# Patient Record
Sex: MALE | Race: WHITE | NOT HISPANIC OR LATINO | URBAN - METROPOLITAN AREA
[De-identification: names, ages, dates, MRNs, and addresses within clinical notes are randomized per-mention and may not be internally consistent; named-entity substitution may affect disease eponyms.]

---

## 2020-11-19 ENCOUNTER — INPATIENT (INPATIENT)
Facility: HOSPITAL | Age: 70
LOS: 7 days | Discharge: HOME HEALTH | DRG: 236 | End: 2020-11-27
Attending: THORACIC SURGERY (CARDIOTHORACIC VASCULAR SURGERY) | Admitting: THORACIC SURGERY (CARDIOTHORACIC VASCULAR SURGERY)
Payer: MEDICARE

## 2020-11-19 VITALS — WEIGHT: 182.1 LBS | HEIGHT: 66 IN

## 2020-11-19 DIAGNOSIS — Z98.890 OTHER SPECIFIED POSTPROCEDURAL STATES: Chronic | ICD-10-CM

## 2020-11-19 DIAGNOSIS — Z90.89 ACQUIRED ABSENCE OF OTHER ORGANS: Chronic | ICD-10-CM

## 2020-11-19 LAB
A1C WITH ESTIMATED AVERAGE GLUCOSE RESULT: 5.3 % — SIGNIFICANT CHANGE UP (ref 4–5.6)
ALBUMIN SERPL ELPH-MCNC: 4.5 G/DL — SIGNIFICANT CHANGE UP (ref 3.3–5)
ALP SERPL-CCNC: 57 U/L — SIGNIFICANT CHANGE UP (ref 40–120)
ALT FLD-CCNC: 39 U/L — SIGNIFICANT CHANGE UP (ref 10–45)
ANION GAP SERPL CALC-SCNC: 12 MMOL/L — SIGNIFICANT CHANGE UP (ref 5–17)
APPEARANCE UR: CLEAR — SIGNIFICANT CHANGE UP
APTT BLD: 31.4 SEC — SIGNIFICANT CHANGE UP (ref 27.5–35.5)
AST SERPL-CCNC: 35 U/L — SIGNIFICANT CHANGE UP (ref 10–40)
BASOPHILS # BLD AUTO: 0.04 K/UL — SIGNIFICANT CHANGE UP (ref 0–0.2)
BASOPHILS NFR BLD AUTO: 0.7 % — SIGNIFICANT CHANGE UP (ref 0–2)
BILIRUB SERPL-MCNC: 0.9 MG/DL — SIGNIFICANT CHANGE UP (ref 0.2–1.2)
BILIRUB UR-MCNC: NEGATIVE — SIGNIFICANT CHANGE UP
BLD GP AB SCN SERPL QL: POSITIVE — SIGNIFICANT CHANGE UP
BUN SERPL-MCNC: 12 MG/DL — SIGNIFICANT CHANGE UP (ref 7–23)
CALCIUM SERPL-MCNC: 9.3 MG/DL — SIGNIFICANT CHANGE UP (ref 8.4–10.5)
CHLORIDE SERPL-SCNC: 101 MMOL/L — SIGNIFICANT CHANGE UP (ref 96–108)
CHOLEST SERPL-MCNC: 170 MG/DL — SIGNIFICANT CHANGE UP
CK MB CFR SERPL CALC: 8.8 NG/ML — HIGH (ref 0–6.7)
CK SERPL-CCNC: 237 U/L — HIGH (ref 30–200)
CO2 SERPL-SCNC: 26 MMOL/L — SIGNIFICANT CHANGE UP (ref 22–31)
COLOR SPEC: YELLOW — SIGNIFICANT CHANGE UP
CREAT SERPL-MCNC: 0.7 MG/DL — SIGNIFICANT CHANGE UP (ref 0.5–1.3)
DIFF PNL FLD: NEGATIVE — SIGNIFICANT CHANGE UP
EOSINOPHIL # BLD AUTO: 0.06 K/UL — SIGNIFICANT CHANGE UP (ref 0–0.5)
EOSINOPHIL NFR BLD AUTO: 1 % — SIGNIFICANT CHANGE UP (ref 0–6)
ESTIMATED AVERAGE GLUCOSE: 105 MG/DL — SIGNIFICANT CHANGE UP (ref 68–114)
GLUCOSE SERPL-MCNC: 96 MG/DL — SIGNIFICANT CHANGE UP (ref 70–99)
GLUCOSE UR QL: NEGATIVE — SIGNIFICANT CHANGE UP
HCT VFR BLD CALC: 43.3 % — SIGNIFICANT CHANGE UP (ref 39–50)
HDLC SERPL-MCNC: 52 MG/DL — SIGNIFICANT CHANGE UP
HGB BLD-MCNC: 15 G/DL — SIGNIFICANT CHANGE UP (ref 13–17)
IMM GRANULOCYTES NFR BLD AUTO: 0.2 % — SIGNIFICANT CHANGE UP (ref 0–1.5)
INR BLD: 1.02 — SIGNIFICANT CHANGE UP (ref 0.88–1.16)
KETONES UR-MCNC: NEGATIVE — SIGNIFICANT CHANGE UP
LEUKOCYTE ESTERASE UR-ACNC: NEGATIVE — SIGNIFICANT CHANGE UP
LIPID PNL WITH DIRECT LDL SERPL: 73 MG/DL — SIGNIFICANT CHANGE UP
LYMPHOCYTES # BLD AUTO: 3.14 K/UL — SIGNIFICANT CHANGE UP (ref 1–3.3)
LYMPHOCYTES # BLD AUTO: 52.1 % — HIGH (ref 13–44)
MCHC RBC-ENTMCNC: 33 PG — SIGNIFICANT CHANGE UP (ref 27–34)
MCHC RBC-ENTMCNC: 34.6 GM/DL — SIGNIFICANT CHANGE UP (ref 32–36)
MCV RBC AUTO: 95.2 FL — SIGNIFICANT CHANGE UP (ref 80–100)
MONOCYTES # BLD AUTO: 0.58 K/UL — SIGNIFICANT CHANGE UP (ref 0–0.9)
MONOCYTES NFR BLD AUTO: 9.6 % — SIGNIFICANT CHANGE UP (ref 2–14)
NEUTROPHILS # BLD AUTO: 2.2 K/UL — SIGNIFICANT CHANGE UP (ref 1.8–7.4)
NEUTROPHILS NFR BLD AUTO: 36.4 % — LOW (ref 43–77)
NITRITE UR-MCNC: NEGATIVE — SIGNIFICANT CHANGE UP
NON HDL CHOLESTEROL: 118 MG/DL — SIGNIFICANT CHANGE UP
NRBC # BLD: 0 /100 WBCS — SIGNIFICANT CHANGE UP (ref 0–0)
NT-PROBNP SERPL-SCNC: 76 PG/ML — SIGNIFICANT CHANGE UP (ref 0–300)
PH UR: 7.5 — SIGNIFICANT CHANGE UP (ref 5–8)
PLATELET # BLD AUTO: 197 K/UL — SIGNIFICANT CHANGE UP (ref 150–400)
POTASSIUM SERPL-MCNC: 3.7 MMOL/L — SIGNIFICANT CHANGE UP (ref 3.5–5.3)
POTASSIUM SERPL-SCNC: 3.7 MMOL/L — SIGNIFICANT CHANGE UP (ref 3.5–5.3)
PROT SERPL-MCNC: 7.5 G/DL — SIGNIFICANT CHANGE UP (ref 6–8.3)
PROT UR-MCNC: NEGATIVE MG/DL — SIGNIFICANT CHANGE UP
PROTHROM AB SERPL-ACNC: 12.2 SEC — SIGNIFICANT CHANGE UP (ref 10.6–13.6)
RBC # BLD: 4.55 M/UL — SIGNIFICANT CHANGE UP (ref 4.2–5.8)
RBC # FLD: 12.1 % — SIGNIFICANT CHANGE UP (ref 10.3–14.5)
RH IG SCN BLD-IMP: POSITIVE — SIGNIFICANT CHANGE UP
SODIUM SERPL-SCNC: 139 MMOL/L — SIGNIFICANT CHANGE UP (ref 135–145)
SP GR SPEC: 1.02 — SIGNIFICANT CHANGE UP (ref 1–1.03)
TRIGL SERPL-MCNC: 225 MG/DL — HIGH
TROPONIN T SERPL-MCNC: <0.01 NG/ML — SIGNIFICANT CHANGE UP (ref 0–0.01)
TSH SERPL-MCNC: 2.74 UIU/ML — SIGNIFICANT CHANGE UP (ref 0.35–4.94)
UROBILINOGEN FLD QL: 0.2 E.U./DL — SIGNIFICANT CHANGE UP
WBC # BLD: 6.03 K/UL — SIGNIFICANT CHANGE UP (ref 3.8–10.5)
WBC # FLD AUTO: 6.03 K/UL — SIGNIFICANT CHANGE UP (ref 3.8–10.5)

## 2020-11-19 PROCEDURE — 86077 PHYS BLOOD BANK SERV XMATCH: CPT

## 2020-11-19 PROCEDURE — 99222 1ST HOSP IP/OBS MODERATE 55: CPT

## 2020-11-19 PROCEDURE — 93880 EXTRACRANIAL BILAT STUDY: CPT | Mod: 26

## 2020-11-19 PROCEDURE — 71045 X-RAY EXAM CHEST 1 VIEW: CPT | Mod: 26

## 2020-11-19 PROCEDURE — 94010 BREATHING CAPACITY TEST: CPT | Mod: 26

## 2020-11-19 RX ORDER — BUPROPION HYDROCHLORIDE 150 MG/1
300 TABLET, EXTENDED RELEASE ORAL DAILY
Refills: 0 | Status: DISCONTINUED | OUTPATIENT
Start: 2020-11-19 | End: 2020-11-23

## 2020-11-19 RX ORDER — METOPROLOL TARTRATE 50 MG
12.5 TABLET ORAL EVERY 6 HOURS
Refills: 0 | Status: DISCONTINUED | OUTPATIENT
Start: 2020-11-19 | End: 2020-11-20

## 2020-11-19 RX ORDER — PANTOPRAZOLE SODIUM 20 MG/1
40 TABLET, DELAYED RELEASE ORAL
Refills: 0 | Status: DISCONTINUED | OUTPATIENT
Start: 2020-11-19 | End: 2020-11-23

## 2020-11-19 RX ORDER — SODIUM CHLORIDE 9 MG/ML
3 INJECTION INTRAMUSCULAR; INTRAVENOUS; SUBCUTANEOUS EVERY 8 HOURS
Refills: 0 | Status: DISCONTINUED | OUTPATIENT
Start: 2020-11-19 | End: 2020-11-23

## 2020-11-19 RX ORDER — HYDRALAZINE HCL 50 MG
5 TABLET ORAL ONCE
Refills: 0 | Status: COMPLETED | OUTPATIENT
Start: 2020-11-19 | End: 2020-11-19

## 2020-11-19 RX ORDER — HEPARIN SODIUM 5000 [USP'U]/ML
5000 INJECTION INTRAVENOUS; SUBCUTANEOUS EVERY 8 HOURS
Refills: 0 | Status: DISCONTINUED | OUTPATIENT
Start: 2020-11-19 | End: 2020-11-21

## 2020-11-19 RX ORDER — ATORVASTATIN CALCIUM 80 MG/1
80 TABLET, FILM COATED ORAL AT BEDTIME
Refills: 0 | Status: DISCONTINUED | OUTPATIENT
Start: 2020-11-19 | End: 2020-11-23

## 2020-11-19 RX ORDER — ASPIRIN/CALCIUM CARB/MAGNESIUM 324 MG
81 TABLET ORAL DAILY
Refills: 0 | Status: DISCONTINUED | OUTPATIENT
Start: 2020-11-19 | End: 2020-11-23

## 2020-11-19 RX ORDER — ISOSORBIDE MONONITRATE 60 MG/1
30 TABLET, EXTENDED RELEASE ORAL DAILY
Refills: 0 | Status: DISCONTINUED | OUTPATIENT
Start: 2020-11-19 | End: 2020-11-23

## 2020-11-19 RX ADMIN — ATORVASTATIN CALCIUM 80 MILLIGRAM(S): 80 TABLET, FILM COATED ORAL at 22:10

## 2020-11-19 RX ADMIN — HEPARIN SODIUM 5000 UNIT(S): 5000 INJECTION INTRAVENOUS; SUBCUTANEOUS at 22:10

## 2020-11-19 RX ADMIN — Medication 5 MILLIGRAM(S): at 17:24

## 2020-11-19 RX ADMIN — SODIUM CHLORIDE 3 MILLILITER(S): 9 INJECTION INTRAMUSCULAR; INTRAVENOUS; SUBCUTANEOUS at 22:06

## 2020-11-19 RX ADMIN — ISOSORBIDE MONONITRATE 30 MILLIGRAM(S): 60 TABLET, EXTENDED RELEASE ORAL at 18:40

## 2020-11-19 RX ADMIN — Medication 12.5 MILLIGRAM(S): at 20:30

## 2020-11-19 NOTE — H&P ADULT - NSICDXPASTSURGICALHX_GEN_ALL_CORE_FT
PAST SURGICAL HISTORY:  S/p bilateral carpal tunnel release     S/P laser trabeculoplasty of eye     S/P right inguinal hernia repair     S/P rotator cuff surgery     S/P tonsillectomy

## 2020-11-19 NOTE — H&P ADULT - NSICDXFAMILYHX_GEN_ALL_CORE_FT
FAMILY HISTORY:  Family history of early CAD, mom s/p CABG at age 65  FH: Alzheimers disease  FH: hyperlipidemia  FH: type 2 diabetes

## 2020-11-19 NOTE — H&P ADULT - NSHPREVIEWOFSYSTEMS_GEN_ALL_CORE
Review of Systems  CONSTITUTIONAL:  Denies Fevers / chills, sweats, fatigue, weight loss, weight gain                                      NEURO:  Denies parathesias, seizures, syncope, confusion                                                                                EYES:  Denies Blurry vision, discharge, pain, loss of vision                                                                                    ENMT:  Denies Difficulty hearing, vertigo, dysphagia, epistaxis, recent dental work                                       CV:  Denies palpitations, BRUCE, orthopnea , + chest pain                                                                                         RESPIRATORY:  Denies Wheezing, SOB, cough / sputum, hemoptysis                                                                GI:  Denies Nausea, vomiting, diarrhea, constipation, melena, difficulty swallowing                                               : Denies Hematuria, dysuria, urgency, incontinence                                                                                         MSK:  Denies arthritis, joint swelling, muscle weakness                                                             SKIN/BREAST:  Denies rash, itching, hair loss, masses                                                                                            PSYCH:  Denies depresion, anxiety, suicidal ideation                                                                                               HEME/LYMPH:  Denies bruises easily, enlarged lymph nodes, tender lymph nodes                                        ENDOCRINE:  Denies cold intolerance, heat intolerance, polydipsia

## 2020-11-19 NOTE — H&P ADULT - NSICDXPASTMEDICALHX_GEN_ALL_CORE_FT
PAST MEDICAL HISTORY:  3-vessel CAD     HLD (hyperlipidemia)     HTN (hypertension)     Non-ST elevation MI (NSTEMI)

## 2020-11-19 NOTE — H&P ADULT - HISTORY OF PRESENT ILLNESS
70 year old male, former 7 pack year smoker (cigarettes and marijuana), with a past medical history significant for HTN, HLD, (remote prior PATY history formerly on CPAP resolved with diet/exercise), s/p cardiac catheterization in 2006 from referred left shoulder pain (findings 40% pLAD, 70% mLCx, 70% PDA determined unable to stent with adequate collateral). In May 2020, patient incidentally found to have CAD/NSTEMI after a hospital/ICU admission for babesiosis. NST 6/2020 revealed small, fixed, mild to moderate basal inferolateral defect with normal wall motion. Patient is followed by Dr. Partida, cardiology, who he presented to 11/11/2020 after several weeks of left sided chest pain with exertion (never at rest), described as a dull ache that resolves with rest. Patient reports he is able to walk several flights of stairs prior without pain. Patient went for an elective cardiac catheterization 11/19/2020 at [facility] with results as follows:       Patient presents today for surgical evaluation by Dr. Cunningham, and presurgical workup for CABG. Patient denies chest pain, sob, abd pain, fevers, n/v/d/c. 70 year old male, former 7 pack year smoker (cigarettes and marijuana), with a past medical history significant for HTN, HLD, (remote prior PATY history formerly on CPAP resolved with diet/exercise), s/p cardiac catheterization in 2006 from referred left shoulder pain (findings 40% pLAD, 70% mLCx, 70% PDA determined unable to stent with adequate collateral). In May 2020, patient incidentally found to have CAD/NSTEMI after a hospital/ICU admission for babesiosis. NST 6/2020 revealed small, fixed, mild to moderate basal inferolateral defect with normal wall motion. Patient is followed by Dr. Partida, cardiology, who he presented to 11/11/2020 after several weeks of left sided chest pain with exertion (never at rest), described as a dull ache that resolves with rest. Patient reports he is able to walk several flights of stairs prior without pain. Patient went for an elective cardiac catheterization 11/19/2020 at [facility] the revealed 3 vessel disease with left main disease.      Patient transferred today to [facility] for surgical evaluation by Dr. Cunningham, and presurgical workup for CABG. Patient denies chest pain, sob, abd pain, fevers, n/v/d/c.

## 2020-11-19 NOTE — H&P ADULT - NSHPSOCIALHISTORY_GEN_ALL_CORE
Social History  Smoker:   YES       Pack Years: 7      When Quit: age [age]  ETOH Use:   YES    Frequency / Quantity: 1 cocktail & 1 glass of wine per night  Ilicit Drug Use:   YES  (marijuana ages [age])  Occupation: Dentist  Assistant Devices:   None   Lives with: Life Partner, Chantel Lepe  ( from wife, daughter from first marriage, age 37- healthcare proxy)

## 2020-11-19 NOTE — H&P ADULT - ASSESSMENT
70 year old male, former 7 pack year smoker (cigarettes and marijuana), with a past medical history significant for HTN, HLD, (remote prior PATY history formerly on CPAP resolved with diet/exercise), s/p cardiac catheterization in 2006 from referred left shoulder pain (findings 40% pLAD, 70% mLCx, 70% PDA determined unable to stent with adequate collateral). In May 2020, patient incidentally found to have CAD/NSTEMI after a hospital/ICU admission for babesiosis. NST 6/2020 revealed small, fixed, mild to moderate basal inferolateral defect with normal wall motion. Patient is followed by Dr. Partida, cardiology, who he presented to 11/11/2020 after several weeks of left sided chest pain with exertion (never at rest), described as a dull ache that resolves with rest. Patient reports he is able to walk several flights of stairs prior without pain. Patient went for an elective cardiac catheterization 11/19/2020 at [facility] the revealed 3 vessel disease with left main disease.  Patient transferred today to [facility] for surgical evaluation by Dr. Cunningham, and presurgical workup for CABG. Patient denies chest pain, sob, abd pain, fevers, n/v/d/c.     ==== Neurovascular ====  -No delirium, pain well managed on current regimen  -C/w PRNs for Pain control: trazadone home med  -Monitor neuro status    ==== Respiratory ====  -Saturates well on RA     -admit CXR  -Monitor respiratory status via SpO2      ==== Cardiovascular ====  -Monitor HR/BP/Tele  -HTN, HLD: metoprolol, imdur,     ==== GI ====   -Tolerating PO  -Prophylaxis: Protonix / Pepcid  -C/w bowel regimen    ==== /Renal ====  -BUN/Cr:   -Trend Cr on AM labs  -Replete electrolytes as needed    ==== ID ====   Afebrile, asymptomatic  -WBC:  -Continue to monitor for SIRS/Sepsis syndrome while inpatient    ==== Endocrine ====   -A1C:  -TSH:    ==== Hematologic ====   -H/H:  -CBC, chem in AM  -DVT ppx: HSQ 5000 / 7500 u q8h / q12h and SCDs    ==== Disposition Planning ====  Home when medically appropriate.     70 year old male, former 7 pack year smoker (cigarettes and marijuana), with a past medical history significant for HTN, HLD, (remote prior PATY history formerly on CPAP resolved with diet/exercise), s/p cardiac catheterization in 2006 from referred left shoulder pain (findings 40% pLAD, 70% mLCx, 70% PDA determined unable to stent with adequate collateral). In May 2020, patient incidentally found to have CAD/NSTEMI after a hospital/ICU admission for babesiosis. NST 6/2020 revealed small, fixed, mild to moderate basal inferolateral defect with normal wall motion. Patient is followed by Dr. Partida, cardiology, who he presented to 11/11/2020 after several weeks of left sided chest pain with exertion (never at rest), described as a dull ache that resolves with rest. Patient reports he is able to walk several flights of stairs prior without pain. Patient went for an elective cardiac catheterization 11/19/2020 at [facility] the revealed 3 vessel disease with left main disease.  Patient transferred today to [facility] for surgical evaluation by Dr. Cunningham, and presurgical workup for CABG. Patient denies chest pain, sob, abd pain, fevers, n/v/d/c.     ==== Neurovascular ====  -No delirium, pain well managed on current regimen  -C/w PRNs for Pain control: trazadone home med  -Monitor neuro status    ==== Respiratory ====  -Saturates well on RA     -admit CXR  -Monitor respiratory status via SpO2    ==== Cardiovascular ====  -Monitor HR/BP/Tele  -HTN, HLD: metoprolol, imdur    ==== GI ====   -Tolerating PO DASH diet  -Prophylaxis: Protonix  -C/w bowel regimen    ==== /Renal ====  -BUN/Cr: 12/0.70  -Trend Cr on AM labs  -Replete electrolytes as needed    ==== ID ====   Afebrile, asymptomatic  -WBC: 6.03  -Continue to monitor for SIRS/Sepsis syndrome while inpatient    ==== Endocrine ====   -A1C: 5.3  -TSH: 2.739    ==== Hematologic ====   -H/H:  -CBC, chem in AM  -DVT ppx: HSQ 5000 / 7500 u q8h / q12h and SCDs    ==== Disposition Planning ====  Home when medically appropriate.     70 year old male, former 7 pack year smoker (cigarettes and marijuana), with a past medical history significant for HTN, HLD, (remote prior PATY history formerly on CPAP resolved with diet/exercise), s/p cardiac catheterization in 2006 from referred left shoulder pain (findings 40% pLAD, 70% mLCx, 70% PDA determined unable to stent with adequate collateral). In May 2020, patient incidentally found to have CAD/NSTEMI after a hospital/ICU admission for babesiosis. NST 6/2020 revealed small, fixed, mild to moderate basal inferolateral defect with normal wall motion. Patient is followed by Dr. Partida, cardiology, who he presented to 11/11/2020 after several weeks of left sided chest pain with exertion (never at rest), described as a dull ache that resolves with rest. Patient reports he is able to walk several flights of stairs prior without pain. Patient went for an elective cardiac catheterization 11/19/2020 at [facility] the revealed 3 vessel disease with left main disease.  Patient transferred today to [facility] for surgical evaluation by Dr. Cunningham, and presurgical workup for CABG. Patient denies chest pain, sob, abd pain, fevers, n/v/d/c.     ==== Neurovascular ====  -No delirium, pain well managed on current regimen  -C/w PRNs for Pain control: trazadone home med  -Monitor neuro status    ==== Respiratory ====  -Saturates well on RA     -admit CXR  -Monitor respiratory status via SpO2    ==== Cardiovascular ====  -CABG workup: TTE, carotid duplex pending  -Monitor HR/BP/Tele  -HTN, HLD: metoprolol, imdur    ==== GI ====   -Tolerating PO DASH diet  -Prophylaxis: Protonix  -C/w bowel regimen    ==== /Renal ====  -BUN/Cr: 12/0.70  -Trend Cr on AM labs  -Replete electrolytes as needed    ==== ID ====   Afebrile, asymptomatic  -WBC: 6.03  -Continue to monitor for SIRS/Sepsis syndrome while inpatient    ==== Endocrine ====   -A1C: 5.3  -TSH: 2.739    ==== Hematologic ====   -H/H: 15/43.3  -CBC, chem in AM  -DVT ppx: HSQ 5000u q8h and SCDs    ==== Disposition Planning ====  Plan for CABG Monday  Home when medically appropriate.

## 2020-11-19 NOTE — H&P ADULT - NSHPPHYSICALEXAM_GEN_ALL_CORE
Physical Exam  CONSTITUTIONAL:               NAD, well nourshed, comfortable sitting up in bed                                                 NEURO:                              AOX3, no focal deficits, motor skills intact                                                           EYES:                                  PERRL  ENMT:                                atraumatic, no palpable lymph nodes  CV:                                      S1/S2, regular rate and rhythm, no murmurs, rubs, gallops  RESPIRATORY:                      CTABL, no rales, rhonchi, wheezing  GI:                                       obese, NT, ND, +BS throughout  :                                 no jenkins              exam deferred  MUSKULOSKELETAL:       5/5 strength bilaterally, gait normal  SKIN / BREAST:                no lesions, warm, well perfused Vital Signs Last 24 Hrs  T(C): 36.2 (19 Nov 2020 17:30), Max: 36.2 (19 Nov 2020 17:30)  T(F): 97.1 (19 Nov 2020 17:30), Max: 97.1 (19 Nov 2020 17:30)  HR: 62 (19 Nov 2020 17:24) (56 - 62)  BP: 174/97 (19 Nov 2020 17:24) (174/97 - 194/88)  BP(mean): 129 (19 Nov 2020 17:24) (127 - 129)  RR: 16 (19 Nov 2020 17:24) (16 - 16)  SpO2: 98% (19 Nov 2020 17:24) (98% - 99%)    Physical Exam  CONSTITUTIONAL:               NAD, well nourshed, comfortable sitting up in bed                                                 NEURO:                              AOX3, no focal deficits, motor skills intact                                                           EYES:                                  PERRL  ENMT:                                atraumatic, no palpable lymph nodes  CV:                                      S1/S2, regular rate and rhythm, no murmurs, rubs, gallops  RESPIRATORY:                      CTABL, no rales, rhonchi, wheezing  GI:                                       obese, NT, ND, +BS throughout  :                                 no jenkins              exam deferred  MUSKULOSKELETAL:       5/5 strength bilaterally, gait normal  SKIN / BREAST:                no lesions, warm, well perfused  Extremities: warm and well perfused. No edema or calf tenderness   Vascular: Radial 2+ L, R with radial band from cath lab. Bilateral DP pulses 2+

## 2020-11-20 PROBLEM — Z00.00 ENCOUNTER FOR PREVENTIVE HEALTH EXAMINATION: Status: ACTIVE | Noted: 2020-11-20

## 2020-11-20 LAB
ALBUMIN SERPL ELPH-MCNC: 3.8 G/DL — SIGNIFICANT CHANGE UP (ref 3.3–5)
ALP SERPL-CCNC: 46 U/L — SIGNIFICANT CHANGE UP (ref 40–120)
ALT FLD-CCNC: 29 U/L — SIGNIFICANT CHANGE UP (ref 10–45)
ANION GAP SERPL CALC-SCNC: 14 MMOL/L — SIGNIFICANT CHANGE UP (ref 5–17)
AST SERPL-CCNC: 26 U/L — SIGNIFICANT CHANGE UP (ref 10–40)
BASOPHILS # BLD AUTO: 0.02 K/UL — SIGNIFICANT CHANGE UP (ref 0–0.2)
BASOPHILS NFR BLD AUTO: 0.3 % — SIGNIFICANT CHANGE UP (ref 0–2)
BILIRUB SERPL-MCNC: 0.8 MG/DL — SIGNIFICANT CHANGE UP (ref 0.2–1.2)
BUN SERPL-MCNC: 20 MG/DL — SIGNIFICANT CHANGE UP (ref 7–23)
CALCIUM SERPL-MCNC: 8.8 MG/DL — SIGNIFICANT CHANGE UP (ref 8.4–10.5)
CHLORIDE SERPL-SCNC: 100 MMOL/L — SIGNIFICANT CHANGE UP (ref 96–108)
CO2 SERPL-SCNC: 24 MMOL/L — SIGNIFICANT CHANGE UP (ref 22–31)
CREAT SERPL-MCNC: 1.02 MG/DL — SIGNIFICANT CHANGE UP (ref 0.5–1.3)
EOSINOPHIL # BLD AUTO: 0.07 K/UL — SIGNIFICANT CHANGE UP (ref 0–0.5)
EOSINOPHIL NFR BLD AUTO: 1.1 % — SIGNIFICANT CHANGE UP (ref 0–6)
GLUCOSE SERPL-MCNC: 100 MG/DL — HIGH (ref 70–99)
HCT VFR BLD CALC: 38.4 % — LOW (ref 39–50)
HCV AB S/CO SERPL IA: 0.07 S/CO — SIGNIFICANT CHANGE UP
HCV AB SERPL-IMP: SIGNIFICANT CHANGE UP
HGB BLD-MCNC: 13.1 G/DL — SIGNIFICANT CHANGE UP (ref 13–17)
IMM GRANULOCYTES NFR BLD AUTO: 0.2 % — SIGNIFICANT CHANGE UP (ref 0–1.5)
LYMPHOCYTES # BLD AUTO: 2.7 K/UL — SIGNIFICANT CHANGE UP (ref 1–3.3)
LYMPHOCYTES # BLD AUTO: 40.8 % — SIGNIFICANT CHANGE UP (ref 13–44)
MAGNESIUM SERPL-MCNC: 2.2 MG/DL — SIGNIFICANT CHANGE UP (ref 1.6–2.6)
MCHC RBC-ENTMCNC: 32.6 PG — SIGNIFICANT CHANGE UP (ref 27–34)
MCHC RBC-ENTMCNC: 34.1 GM/DL — SIGNIFICANT CHANGE UP (ref 32–36)
MCV RBC AUTO: 95.5 FL — SIGNIFICANT CHANGE UP (ref 80–100)
MONOCYTES # BLD AUTO: 0.63 K/UL — SIGNIFICANT CHANGE UP (ref 0–0.9)
MONOCYTES NFR BLD AUTO: 9.5 % — SIGNIFICANT CHANGE UP (ref 2–14)
NEUTROPHILS # BLD AUTO: 3.19 K/UL — SIGNIFICANT CHANGE UP (ref 1.8–7.4)
NEUTROPHILS NFR BLD AUTO: 48.1 % — SIGNIFICANT CHANGE UP (ref 43–77)
NRBC # BLD: 0 /100 WBCS — SIGNIFICANT CHANGE UP (ref 0–0)
PLATELET # BLD AUTO: 177 K/UL — SIGNIFICANT CHANGE UP (ref 150–400)
POTASSIUM SERPL-MCNC: 3.7 MMOL/L — SIGNIFICANT CHANGE UP (ref 3.5–5.3)
POTASSIUM SERPL-SCNC: 3.7 MMOL/L — SIGNIFICANT CHANGE UP (ref 3.5–5.3)
PROT SERPL-MCNC: 6.4 G/DL — SIGNIFICANT CHANGE UP (ref 6–8.3)
RBC # BLD: 4.02 M/UL — LOW (ref 4.2–5.8)
RBC # FLD: 12.3 % — SIGNIFICANT CHANGE UP (ref 10.3–14.5)
SODIUM SERPL-SCNC: 138 MMOL/L — SIGNIFICANT CHANGE UP (ref 135–145)
WBC # BLD: 6.62 K/UL — SIGNIFICANT CHANGE UP (ref 3.8–10.5)
WBC # FLD AUTO: 6.62 K/UL — SIGNIFICANT CHANGE UP (ref 3.8–10.5)

## 2020-11-20 PROCEDURE — 99231 SBSQ HOSP IP/OBS SF/LOW 25: CPT

## 2020-11-20 PROCEDURE — 93306 TTE W/DOPPLER COMPLETE: CPT | Mod: 26

## 2020-11-20 RX ORDER — METOPROLOL TARTRATE 50 MG
12.5 TABLET ORAL EVERY 12 HOURS
Refills: 0 | Status: DISCONTINUED | OUTPATIENT
Start: 2020-11-21 | End: 2020-11-22

## 2020-11-20 RX ORDER — POTASSIUM CHLORIDE 20 MEQ
40 PACKET (EA) ORAL ONCE
Refills: 0 | Status: COMPLETED | OUTPATIENT
Start: 2020-11-20 | End: 2020-11-20

## 2020-11-20 RX ORDER — TRAZODONE HCL 50 MG
50 TABLET ORAL ONCE
Refills: 0 | Status: COMPLETED | OUTPATIENT
Start: 2020-11-20 | End: 2020-11-21

## 2020-11-20 RX ADMIN — PANTOPRAZOLE SODIUM 40 MILLIGRAM(S): 20 TABLET, DELAYED RELEASE ORAL at 06:36

## 2020-11-20 RX ADMIN — Medication 40 MILLIEQUIVALENT(S): at 14:15

## 2020-11-20 RX ADMIN — SODIUM CHLORIDE 3 MILLILITER(S): 9 INJECTION INTRAMUSCULAR; INTRAVENOUS; SUBCUTANEOUS at 05:47

## 2020-11-20 RX ADMIN — HEPARIN SODIUM 5000 UNIT(S): 5000 INJECTION INTRAVENOUS; SUBCUTANEOUS at 14:15

## 2020-11-20 RX ADMIN — Medication 81 MILLIGRAM(S): at 12:09

## 2020-11-20 RX ADMIN — Medication 12.5 MILLIGRAM(S): at 12:09

## 2020-11-20 RX ADMIN — HEPARIN SODIUM 5000 UNIT(S): 5000 INJECTION INTRAVENOUS; SUBCUTANEOUS at 21:25

## 2020-11-20 RX ADMIN — ATORVASTATIN CALCIUM 80 MILLIGRAM(S): 80 TABLET, FILM COATED ORAL at 21:25

## 2020-11-20 RX ADMIN — BUPROPION HYDROCHLORIDE 300 MILLIGRAM(S): 150 TABLET, EXTENDED RELEASE ORAL at 12:09

## 2020-11-20 RX ADMIN — SODIUM CHLORIDE 3 MILLILITER(S): 9 INJECTION INTRAMUSCULAR; INTRAVENOUS; SUBCUTANEOUS at 21:20

## 2020-11-20 RX ADMIN — SODIUM CHLORIDE 3 MILLILITER(S): 9 INJECTION INTRAMUSCULAR; INTRAVENOUS; SUBCUTANEOUS at 14:10

## 2020-11-20 RX ADMIN — HEPARIN SODIUM 5000 UNIT(S): 5000 INJECTION INTRAVENOUS; SUBCUTANEOUS at 06:36

## 2020-11-20 RX ADMIN — ISOSORBIDE MONONITRATE 30 MILLIGRAM(S): 60 TABLET, EXTENDED RELEASE ORAL at 12:09

## 2020-11-20 NOTE — PROGRESS NOTE ADULT - SUBJECTIVE AND OBJECTIVE BOX
Patient discussed on morning rounds with Dr. Cunningham      Operation / Date: CABG planned for     SUBJECTIVE ASSESSMENT:  70y Male here for pre-op workup for CABG. No complaints this morning. Would like to be on bowel regimen for constipation. Otherwise, preop workup including carotid duplex, spirometry and TTE performed. Patient denies chest pain, sob, fevers, n/v.       Vital Signs Last 24 Hrs  T(C): 36.2 (2020 09:03), Max: 36.4 (2020 01:01)  T(F): 97.2 (2020 09:03), Max: 97.5 (2020 01:01)  HR: 72 (2020 12:07) (54 - 72)  BP: 134/75 (2020 12:07) (102/59 - 194/88)  BP(mean): 98 (2020 12:07) (74 - 129)  RR: 18 (2020 12:07) (16 - 18)  SpO2: 99% (2020 12:07) (93% - 99%)  I&O's Detail    2020 07:01  -  2020 07:00  --------------------------------------------------------  IN:  Total IN: 0 mL    OUT:    Voided (mL): 1000 mL  Total OUT: 1000 mL    Total NET: -1000 mL      2020 07:01  -  2020 13:19  --------------------------------------------------------  IN:    Oral Fluid: 240 mL  Total IN: 240 mL    OUT:  Total OUT: 0 mL    Total NET: 240 mL      CHEST TUBE:  No.   KAN DRAIN:  No.  EPICARDIAL WIRES: No.  TIE DOWNS: No.  GAR: No.    PHYSICAL EXAM:    General: no acute distress, sitting comfortably in chair    Neurological: AOx3, no focal neuro deficits    Cardiovascular: RRR, no murmurs, gallops, rubs    Respiratory: CTABL    Gastrointestinal: normoactive BS, NT, ND    Extremities: WWP, no LE edema    Vascular: distal pulses 2+ bilaterally    Incision Sites: NA    LABS:                        13.1   6.62  )-----------( 177      ( 2020 06:09 )             38.4       COUMADIN:  No.     PT/INR - ( 2020 16:42 )   PT: 12.2 sec;   INR: 1.02          PTT - ( 2020 16:42 )  PTT:31.4 sec        138  |  100  |  20  ----------------------------<  100<H>  3.7   |  24  |  1.02    Ca    8.8      2020 06:09  Mg     2.2         TPro  6.4  /  Alb  3.8  /  TBili  0.8  /  DBili  x   /  AST  26  /  ALT  29  /  AlkPhos  46  -20      Urinalysis Basic - ( 2020 17:58 )    Color: Yellow / Appearance: Clear / S.020 / pH: x  Gluc: x / Ketone: NEGATIVE  / Bili: Negative / Urobili: 0.2 E.U./dL   Blood: x / Protein: NEGATIVE mg/dL / Nitrite: NEGATIVE   Leuk Esterase: NEGATIVE / RBC: x / WBC x   Sq Epi: x / Non Sq Epi: x / Bacteria: x      MEDICATIONS  (STANDING):  aspirin enteric coated 81 milliGRAM(s) Oral daily  atorvastatin 80 milliGRAM(s) Oral at bedtime  buPROPion XL . 300 milliGRAM(s) Oral daily  heparin   Injectable 5000 Unit(s) SubCutaneous every 8 hours  isosorbide   mononitrate ER Tablet (IMDUR) 30 milliGRAM(s) Oral daily  metoprolol tartrate 12.5 milliGRAM(s) Oral every 6 hours  pantoprazole    Tablet 40 milliGRAM(s) Oral before breakfast  potassium chloride    Tablet ER 40 milliEquivalent(s) Oral once  sodium chloride 0.9% lock flush 3 milliLiter(s) IV Push every 8 hours    MEDICATIONS  (PRN):    RADIOLOGY & ADDITIONAL TESTS:    < from: US Duplex Carotid Arteries Complete, Bilateral (20 @ 19:47) >    EXAM:  US DPLX CAROTIDS COMPL BI                          PROCEDURE DATE:  2020 >    IMPRESSION:  No hemodynamically significant stenosis of the bilateral carotid arteries.  Mild calcified and noncalcified plaque involving the bilateral bifurcations and proximal ICAs  Mild bilateralintimal medial thickening of the common carotid arteries left greater than right.    >  < from: Xray Chest 1 View-PORTABLE IMMEDIATE (Xray Chest 1 View-PORTABLE IMMEDIATE .) (20 @ 17:54) >  EXAM:  XR CHEST PORTABLE IMMED 1V        Findings/  impression: Cardiac magnification, thoracic aortic calcification. Lungs and mediastinum are unremarkable. Left clavicular and right rib old fractures,, right humeral surgical anchors.       Patient discussed on morning rounds with Dr. Cunningham      Operation / Date: CABG planned for     SUBJECTIVE ASSESSMENT:  70y Male here for pre-op workup for CABG. No complaints this morning. Would like to be on bowel regimen for constipation. Otherwise, preop workup including carotid duplex, spirometry and TTE performed. Patient denies chest pain, sob, fevers, n/v.       Vital Signs Last 24 Hrs  T(C): 36.2 (2020 09:03), Max: 36.4 (2020 01:01)  T(F): 97.2 (2020 09:03), Max: 97.5 (2020 01:01)  HR: 72 (2020 12:07) (54 - 72)  BP: 134/75 (2020 12:07) (102/59 - 194/88)  BP(mean): 98 (2020 12:07) (74 - 129)  RR: 18 (2020 12:07) (16 - 18)  SpO2: 99% (2020 12:07) (93% - 99%)  I&O's Detail    2020 07:01  -  2020 07:00  --------------------------------------------------------  IN:  Total IN: 0 mL    OUT:    Voided (mL): 1000 mL  Total OUT: 1000 mL    Total NET: -1000 mL      2020 07:01  -  2020 13:19  --------------------------------------------------------   IN:    Oral Fluid: 240 mL  Total IN: 240 mL    OUT:  Total OUT: 0 mL    Total NET: 240 mL      CHEST TUBE:  No.   KAN DRAIN:  No.  EPICARDIAL WIRES: No.  TIE DOWNS: No.  GAR: No.    PHYSICAL EXAM:    General: no acute distress, sitting comfortably in chair    Neurological: AOx3, no focal neuro deficits    Cardiovascular: RRR, no murmurs, gallops, rubs    Respiratory: CTABL    Gastrointestinal: normoactive BS, NT, ND    Extremities: WWP, no LE edema    Vascular: distal pulses 2+ bilaterally    Incision Sites: NA    LABS:                        13.1   6.62  )-----------( 177      ( 2020 06:09 )             38.4       COUMADIN:  No.     PT/INR - ( 2020 16:42 )   PT: 12.2 sec;   INR: 1.02          PTT - ( 2020 16:42 )  PTT:31.4 sec        138  |  100  |  20  ----------------------------<  100<H>  3.7   |  24  |  1.02    Ca    8.8      2020 06:09  Mg     2.2         TPro  6.4  /  Alb  3.8  /  TBili  0.8  /  DBili  x   /  AST  26  /  ALT  29  /  AlkPhos  46  -20      Urinalysis Basic - ( 2020 17:58 )    Color: Yellow / Appearance: Clear / S.020 / pH: x  Gluc: x / Ketone: NEGATIVE  / Bili: Negative / Urobili: 0.2 E.U./dL   Blood: x / Protein: NEGATIVE mg/dL / Nitrite: NEGATIVE   Leuk Esterase: NEGATIVE / RBC: x / WBC x   Sq Epi: x / Non Sq Epi: x / Bacteria: x      MEDICATIONS  (STANDING):  aspirin enteric coated 81 milliGRAM(s) Oral daily  atorvastatin 80 milliGRAM(s) Oral at bedtime  buPROPion XL . 300 milliGRAM(s) Oral daily  heparin   Injectable 5000 Unit(s) SubCutaneous every 8 hours  isosorbide   mononitrate ER Tablet (IMDUR) 30 milliGRAM(s) Oral daily  metoprolol tartrate 12.5 milliGRAM(s) Oral every 6 hours  pantoprazole    Tablet 40 milliGRAM(s) Oral before breakfast  potassium chloride    Tablet ER 40 milliEquivalent(s) Oral once  sodium chloride 0.9% lock flush 3 milliLiter(s) IV Push every 8 hours    MEDICATIONS  (PRN):    RADIOLOGY & ADDITIONAL TESTS:    < from: US Duplex Carotid Arteries Complete, Bilateral (20 @ 19:47) >    EXAM:  US DPLX CAROTIDS COMPL BI                          PROCEDURE DATE:  2020 >    IMPRESSION:  No hemodynamically significant stenosis of the bilateral carotid arteries.  Mild calcified and noncalcified plaque involving the bilateral bifurcations and proximal ICAs  Mild bilateralintimal medial thickening of the common carotid arteries left greater than right.    >  < from: Xray Chest 1 View-PORTABLE IMMEDIATE (Xray Chest 1 View-PORTABLE IMMEDIATE .) (20 @ 17:54) >  EXAM:  XR CHEST PORTABLE IMMED 1V        Findings/  impression: Cardiac magnification, thoracic aortic calcification. Lungs and mediastinum are unremarkable. Left clavicular and right rib old fractures,, right humeral surgical anchors.

## 2020-11-20 NOTE — PROGRESS NOTE ADULT - ASSESSMENT
70 year old male, former 7 pack year smoker (cigarettes and marijuana), with a past medical history significant for HTN, HLD, (remote prior PATY history formerly on CPAP resolved with diet/exercise), s/p cardiac catheterization in 2006 from referred left shoulder pain (findings 40% pLAD, 70% mLCx, 70% PDA determined unable to stent with adequate collateral). In May 2020, patient incidentally found to have CAD/NSTEMI after a hospital/ICU admission for babesiosis. NST 6/2020 revealed small, fixed, mild to moderate basal inferolateral defect with normal wall motion. Patient is followed by Dr. Partida, cardiology, who he presented to 11/11/2020 after several weeks of left sided chest pain with exertion (never at rest), described as a dull ache that resolves with rest. Patient reports he is able to walk several flights of stairs prior without pain. Patient went for an elective cardiac catheterization 11/19/2020 at [facility] the revealed 3 vessel disease with left main disease.  Patient transferred today to [facility] for surgical evaluation by Dr. Cunningham, and presurgical workup for CABG. Echo, carotid duplex, and spirometry performed (results pending or listed above). Patient started on bowel regimen for consipation.    ==== Neurovascular ====  -No delirium, pain well managed on current regimen  -C/w PRNs for Pain control: trazadone home med (held)  -Monitor neuro status    ==== Respiratory ====  -Saturates well on RA     -CXR stable  -preop spirometry performed, WNL  -Monitor respiratory status via SpO2    ==== Cardiovascular ====  -CABG workup: TTE (final results pending, prelim no valvular disease, good EF); carotid duplex No hemodynamically significant stenosis of the bilateral carotid arteries; Mild calcified and noncalcified plaque involving the bilateral bifurcations and proximal ICAs; Mild bilateral intimal medial thickening of the common carotid arteries left greater than right.  -Monitor HR/BP/Tele  -HTN, HLD: metoprolol, imdur  -EKG pending    ==== GI ====   -Tolerating PO DASH diet  -Prophylaxis: Protonix  -C/w bowel regimen: senna, miralax    ==== /Renal ====  -BUN/Cr: 20/1.02  -Trend Cr on AM labs  -Replete electrolytes as needed    ==== ID ====   Afebrile, asymptomatic  -WBC: 6.62  -Continue to monitor for SIRS/Sepsis syndrome while inpatient    ==== Endocrine ====   -A1C: 5.3  -TSH: 2.739    ==== Hematologic ====   -H/H: 13.1/38.4  -CBC, chem in AM  -DVT ppx: HSQ 5000u q8h and SCDs    ==== Disposition Planning ====  Plan for CABG Monday  Home when medically appropriate.

## 2020-11-21 LAB
ANION GAP SERPL CALC-SCNC: 11 MMOL/L — SIGNIFICANT CHANGE UP (ref 5–17)
BLD GP AB SCN SERPL QL: POSITIVE — SIGNIFICANT CHANGE UP
BUN SERPL-MCNC: 18 MG/DL — SIGNIFICANT CHANGE UP (ref 7–23)
CALCIUM SERPL-MCNC: 8.9 MG/DL — SIGNIFICANT CHANGE UP (ref 8.4–10.5)
CHLORIDE SERPL-SCNC: 102 MMOL/L — SIGNIFICANT CHANGE UP (ref 96–108)
CO2 SERPL-SCNC: 25 MMOL/L — SIGNIFICANT CHANGE UP (ref 22–31)
CREAT SERPL-MCNC: 0.78 MG/DL — SIGNIFICANT CHANGE UP (ref 0.5–1.3)
GLUCOSE SERPL-MCNC: 97 MG/DL — SIGNIFICANT CHANGE UP (ref 70–99)
POTASSIUM SERPL-MCNC: 3.8 MMOL/L — SIGNIFICANT CHANGE UP (ref 3.5–5.3)
POTASSIUM SERPL-SCNC: 3.8 MMOL/L — SIGNIFICANT CHANGE UP (ref 3.5–5.3)
RH IG SCN BLD-IMP: POSITIVE — SIGNIFICANT CHANGE UP
SODIUM SERPL-SCNC: 138 MMOL/L — SIGNIFICANT CHANGE UP (ref 135–145)

## 2020-11-21 PROCEDURE — 93971 EXTREMITY STUDY: CPT | Mod: 26

## 2020-11-21 RX ORDER — ENOXAPARIN SODIUM 100 MG/ML
80 INJECTION SUBCUTANEOUS ONCE
Refills: 0 | Status: COMPLETED | OUTPATIENT
Start: 2020-11-21 | End: 2020-11-21

## 2020-11-21 RX ADMIN — Medication 81 MILLIGRAM(S): at 11:23

## 2020-11-21 RX ADMIN — HEPARIN SODIUM 5000 UNIT(S): 5000 INJECTION INTRAVENOUS; SUBCUTANEOUS at 05:26

## 2020-11-21 RX ADMIN — Medication 50 MILLIGRAM(S): at 00:50

## 2020-11-21 RX ADMIN — ISOSORBIDE MONONITRATE 30 MILLIGRAM(S): 60 TABLET, EXTENDED RELEASE ORAL at 11:23

## 2020-11-21 RX ADMIN — SODIUM CHLORIDE 3 MILLILITER(S): 9 INJECTION INTRAMUSCULAR; INTRAVENOUS; SUBCUTANEOUS at 21:39

## 2020-11-21 RX ADMIN — ENOXAPARIN SODIUM 80 MILLIGRAM(S): 100 INJECTION SUBCUTANEOUS at 21:48

## 2020-11-21 RX ADMIN — BUPROPION HYDROCHLORIDE 300 MILLIGRAM(S): 150 TABLET, EXTENDED RELEASE ORAL at 11:23

## 2020-11-21 RX ADMIN — ENOXAPARIN SODIUM 80 MILLIGRAM(S): 100 INJECTION SUBCUTANEOUS at 13:33

## 2020-11-21 RX ADMIN — Medication 12.5 MILLIGRAM(S): at 05:26

## 2020-11-21 RX ADMIN — SODIUM CHLORIDE 3 MILLILITER(S): 9 INJECTION INTRAMUSCULAR; INTRAVENOUS; SUBCUTANEOUS at 05:02

## 2020-11-21 RX ADMIN — SODIUM CHLORIDE 3 MILLILITER(S): 9 INJECTION INTRAMUSCULAR; INTRAVENOUS; SUBCUTANEOUS at 13:29

## 2020-11-21 RX ADMIN — Medication 12.5 MILLIGRAM(S): at 16:54

## 2020-11-21 RX ADMIN — ATORVASTATIN CALCIUM 80 MILLIGRAM(S): 80 TABLET, FILM COATED ORAL at 21:48

## 2020-11-21 RX ADMIN — PANTOPRAZOLE SODIUM 40 MILLIGRAM(S): 20 TABLET, DELAYED RELEASE ORAL at 06:08

## 2020-11-21 NOTE — CHART NOTE - NSCHARTNOTEFT_GEN_A_CORE
Vein Mapping performed from groin to ankle bilaterally.  Conduit appropriate for use in bilateral lower extremities.  RLE GSV 4-5mm.  LLE GSV 4-6mm.    Radial Artery assessed: PI Left thumb 1.15 -> 1.0, Left index finger 1.2 1->1.1, Left ring finger 1.16->1.08.  Ultrasound evaluation showed no calcium and appropriate conduit.  Ultrasound of palmar arch shows competent ulnar artery.

## 2020-11-21 NOTE — PROGRESS NOTE ADULT - SUBJECTIVE AND OBJECTIVE BOX
Patient discussed on morning rounds with Dr. Cunningham    Operation / Date: pre-op w/u for CABG on Monday     SUBJECTIVE ASSESSMENT:  Patient is feeling well today. No complaints other than trying to relax in anticipation for surgery. Eating/drinking well. Moving bowels regularly. Denies any CP, palpitations, SOB, wheezing, abd pain, n/v/d/c, fevers or chills.     Vital Signs Last 24 Hrs  T(C): 36.6 (2020 10:02), Max: 36.8 (2020 13:44)  T(F): 97.9 (2020 10:02), Max: 98.3 (2020 13:44)  HR: 62 (2020 04:59) (58 - 66)  BP: 133/82 (2020 12:13) (118/57 - 136/82)  BP(mean): 101 (2020 12:13) (82 - 104)  RR: 16 (2020 12:13) (16 - 18)  SpO2: 97% (2020 12:13) (93% - 99%)  I&O's Detail    2020 07:01  -  2020 07:00  --------------------------------------------------------  IN:    Oral Fluid: 240 mL  Total IN: 240 mL    OUT:  Total OUT: 0 mL    Total NET: 240 mL    CHEST TUBE:  no  KAN DRAIN:  no  EPICARDIAL WIRES: no  TIE DOWNS: no  GAR: no    PHYSICAL EXAM:  General: well appearing sitting up in bed in NAD   Neurological: AOx3. Motor skills grossly intact  Cardiovascular: Normal S1/S2. Regular rate/rhythm. No murmurs  Respiratory: Lungs CTA bilaterally. No wheezing or rales  Gastrointestinal: +BS in all 4 quadrants. Non-distended. Soft. Non-tender  Extremities: Strength 5/5 b/l upper/lower extremities. Sensation grossly intact upper/lower extremities. No edema. No calf tenderness.  Vascular: Radial 2+bilaterally, DP 2+ b/l  Incision Sites: none     LABS:                        13.1   6.62  )-----------( 177      ( 2020 06:09 )             38.4       COUMADIN: no    PT/INR - ( 2020 16:42 )   PT: 12.2 sec;   INR: 1.02     PTT - ( 2020 16:42 )  PTT:31.4 sec    11    138  |  102  |  18  ----------------------------<  97  3.8   |  25  |  0.78    Ca    8.9      2020 06:04  Mg     2.2         TPro  6.4  /  Alb  3.8  /  TBili  0.8  /  DBili  x   /  AST  26  /  ALT  29  /  AlkPhos  46  20      Urinalysis Basic - ( 2020 17:58 )    Color: Yellow / Appearance: Clear / S.020 / pH: x  Gluc: x / Ketone: NEGATIVE  / Bili: Negative / Urobili: 0.2 E.U./dL   Blood: x / Protein: NEGATIVE mg/dL / Nitrite: NEGATIVE   Leuk Esterase: NEGATIVE / RBC: x / WBC x   Sq Epi: x / Non Sq Epi: x / Bacteria: x      MEDICATIONS  (STANDING):  aspirin enteric coated 81 milliGRAM(s) Oral daily  atorvastatin 80 milliGRAM(s) Oral at bedtime  buPROPion XL . 300 milliGRAM(s) Oral daily  enoxaparin Injectable 80 milliGRAM(s) SubCutaneous once  isosorbide   mononitrate ER Tablet (IMDUR) 30 milliGRAM(s) Oral daily  metoprolol tartrate 12.5 milliGRAM(s) Oral every 12 hours  pantoprazole    Tablet 40 milliGRAM(s) Oral before breakfast  polyethylene glycol 3350 17 Gram(s) Oral daily  senna 2 Tablet(s) Oral at bedtime  sodium chloride 0.9% lock flush 3 milliLiter(s) IV Push every 8 hours    MEDICATIONS  (PRN):    RADIOLOGY & ADDITIONAL TESTS:  < from: Xray Chest 1 View-PORTABLE IMMEDIATE (Xray Chest 1 View-PORTABLE IMMEDIATE .) (20 @ 17:54) >  Findings/  impression: Cardiac magnification, thoracic aortic calcification. Lungs and mediastinum are unremarkable. Left clavicular and right rib old fractures,, right humeral surgical anchors.  < end of copied text >

## 2020-11-21 NOTE — PROGRESS NOTE ADULT - ASSESSMENT
This is a 71 y/o M, former 7 pack year smoker (cigarettes and marijuana), with a past medical history significant for HTN, HLD, (remote prior PATY history formerly on CPAP resolved with diet/exercise), s/p cardiac catheterization in 2006 from referred left shoulder pain (findings 40% pLAD, 70% mLCx, 70% PDA determined unable to stent with adequate collateral). In May 2020, patient incidentally found to have CAD/NSTEMI after a hospital/ICU admission for babesiosis. NST 6/2020 revealed small, fixed, mild to moderate basal inferolateral defect with normal wall motion. Patient is followed by Dr. Partida, cardiology, who he presented to 11/11/2020 after several weeks of left sided chest pain with exertion (never at rest), described as a dull ache that resolves with rest. Pt went for an elective cardiac catheterization 11/19/2020 at [facility] the revealed 3 vessel disease with left main disease. Pt transferred to [facility] for surgical evaluation by Dr. Cunningham, and presurgical workup for CABG. Plan is OR on Monday.     Plan:    Neurovascular:   -Stable    Cardiovascular:   -pre-op for CABG on Monday with Dr. Cunningham   -PST completed, pending ABG  *of note, type and screens showing "cold auto antibody" (Dr. Cunningham aware)   -Hemodynamically stable.   -Monitor: BP, HR, tele    Respiratory:   -Oxygenating well on room air  -Encourage continued use of IS 10x/hr and frequent ambulation  -CXR: no acute pathology, no PTX     GI:  -GI PPX: pantoprazole 40mg daily   -PO Diet: DASH/TLC   -Bowel Regimen: senna, PRN miralax     Renal / :  -Continue to monitor renal function: BUN/Cr 18/0.78  -Monitor I/O's daily     Endocrine:    -No hx of DM or thyroid dx  -A1c: 5.3  -TSH: 2.73     Hematologic:  -CBC: H/H- 13/38   -Coagulation Panel: WNL     ID:  -Temperature: afebrile   -CBC: WBC- 6   -Continue to observe for SIRS/Sepsis Syndrome.    Prophylaxis:  -DVT prophylaxis with Lovenox 80mg BID (for today, d/c tomorrow and start heparin gtt tomorrow)   -Continue with SCD's b/l while patient is at rest     Disposition:  -OR Monday with Dr. Cunningham for CABG

## 2020-11-22 ENCOUNTER — TRANSCRIPTION ENCOUNTER (OUTPATIENT)
Age: 70
End: 2020-11-22

## 2020-11-22 LAB
ANION GAP SERPL CALC-SCNC: 8 MMOL/L — SIGNIFICANT CHANGE UP (ref 5–17)
APTT BLD: 41.1 SEC — HIGH (ref 27.5–35.5)
APTT BLD: 55.4 SEC — HIGH (ref 27.5–35.5)
APTT BLD: 57.1 SEC — HIGH (ref 27.5–35.5)
BLD GP AB SCN SERPL QL: POSITIVE — SIGNIFICANT CHANGE UP
BUN SERPL-MCNC: 14 MG/DL — SIGNIFICANT CHANGE UP (ref 7–23)
CALCIUM SERPL-MCNC: 9.3 MG/DL — SIGNIFICANT CHANGE UP (ref 8.4–10.5)
CHLORIDE SERPL-SCNC: 105 MMOL/L — SIGNIFICANT CHANGE UP (ref 96–108)
CO2 SERPL-SCNC: 27 MMOL/L — SIGNIFICANT CHANGE UP (ref 22–31)
CREAT SERPL-MCNC: 0.92 MG/DL — SIGNIFICANT CHANGE UP (ref 0.5–1.3)
GLUCOSE SERPL-MCNC: 101 MG/DL — HIGH (ref 70–99)
HCT VFR BLD CALC: 37.7 % — LOW (ref 39–50)
HGB BLD-MCNC: 13.2 G/DL — SIGNIFICANT CHANGE UP (ref 13–17)
MAGNESIUM SERPL-MCNC: 2.1 MG/DL — SIGNIFICANT CHANGE UP (ref 1.6–2.6)
MCHC RBC-ENTMCNC: 33.4 PG — SIGNIFICANT CHANGE UP (ref 27–34)
MCHC RBC-ENTMCNC: 35 GM/DL — SIGNIFICANT CHANGE UP (ref 32–36)
MCV RBC AUTO: 95.4 FL — SIGNIFICANT CHANGE UP (ref 80–100)
NRBC # BLD: 0 /100 WBCS — SIGNIFICANT CHANGE UP (ref 0–0)
PLATELET # BLD AUTO: 162 K/UL — SIGNIFICANT CHANGE UP (ref 150–400)
POTASSIUM SERPL-MCNC: 4.2 MMOL/L — SIGNIFICANT CHANGE UP (ref 3.5–5.3)
POTASSIUM SERPL-SCNC: 4.2 MMOL/L — SIGNIFICANT CHANGE UP (ref 3.5–5.3)
RBC # BLD: 3.95 M/UL — LOW (ref 4.2–5.8)
RBC # FLD: 12.1 % — SIGNIFICANT CHANGE UP (ref 10.3–14.5)
RH IG SCN BLD-IMP: POSITIVE — SIGNIFICANT CHANGE UP
SODIUM SERPL-SCNC: 140 MMOL/L — SIGNIFICANT CHANGE UP (ref 135–145)
WBC # BLD: 6.69 K/UL — SIGNIFICANT CHANGE UP (ref 3.8–10.5)
WBC # FLD AUTO: 6.69 K/UL — SIGNIFICANT CHANGE UP (ref 3.8–10.5)

## 2020-11-22 RX ORDER — CHLORHEXIDINE GLUCONATE 213 G/1000ML
10 SOLUTION TOPICAL ONCE
Refills: 0 | Status: COMPLETED | OUTPATIENT
Start: 2020-11-22 | End: 2020-11-23

## 2020-11-22 RX ORDER — CHLORHEXIDINE GLUCONATE 213 G/1000ML
1 SOLUTION TOPICAL ONCE
Refills: 0 | Status: COMPLETED | OUTPATIENT
Start: 2020-11-22 | End: 2020-11-22

## 2020-11-22 RX ORDER — HEPARIN SODIUM 5000 [USP'U]/ML
900 INJECTION INTRAVENOUS; SUBCUTANEOUS
Qty: 25000 | Refills: 0 | Status: DISCONTINUED | OUTPATIENT
Start: 2020-11-22 | End: 2020-11-23

## 2020-11-22 RX ORDER — METOPROLOL TARTRATE 50 MG
12.5 TABLET ORAL ONCE
Refills: 0 | Status: COMPLETED | OUTPATIENT
Start: 2020-11-22 | End: 2020-11-22

## 2020-11-22 RX ORDER — TRAZODONE HCL 50 MG
50 TABLET ORAL AT BEDTIME
Refills: 0 | Status: DISCONTINUED | OUTPATIENT
Start: 2020-11-22 | End: 2020-11-23

## 2020-11-22 RX ORDER — METOPROLOL TARTRATE 50 MG
25 TABLET ORAL EVERY 12 HOURS
Refills: 0 | Status: DISCONTINUED | OUTPATIENT
Start: 2020-11-22 | End: 2020-11-23

## 2020-11-22 RX ORDER — CHLORHEXIDINE GLUCONATE 213 G/1000ML
1 SOLUTION TOPICAL ONCE
Refills: 0 | Status: COMPLETED | OUTPATIENT
Start: 2020-11-23 | End: 2020-11-23

## 2020-11-22 RX ADMIN — Medication 81 MILLIGRAM(S): at 11:05

## 2020-11-22 RX ADMIN — BUPROPION HYDROCHLORIDE 300 MILLIGRAM(S): 150 TABLET, EXTENDED RELEASE ORAL at 11:06

## 2020-11-22 RX ADMIN — PANTOPRAZOLE SODIUM 40 MILLIGRAM(S): 20 TABLET, DELAYED RELEASE ORAL at 05:06

## 2020-11-22 RX ADMIN — HEPARIN SODIUM 9 UNIT(S)/HR: 5000 INJECTION INTRAVENOUS; SUBCUTANEOUS at 08:45

## 2020-11-22 RX ADMIN — ISOSORBIDE MONONITRATE 30 MILLIGRAM(S): 60 TABLET, EXTENDED RELEASE ORAL at 11:05

## 2020-11-22 RX ADMIN — SODIUM CHLORIDE 3 MILLILITER(S): 9 INJECTION INTRAMUSCULAR; INTRAVENOUS; SUBCUTANEOUS at 13:52

## 2020-11-22 RX ADMIN — SODIUM CHLORIDE 3 MILLILITER(S): 9 INJECTION INTRAMUSCULAR; INTRAVENOUS; SUBCUTANEOUS at 05:04

## 2020-11-22 RX ADMIN — Medication 12.5 MILLIGRAM(S): at 05:06

## 2020-11-22 RX ADMIN — Medication 12.5 MILLIGRAM(S): at 08:45

## 2020-11-22 RX ADMIN — CHLORHEXIDINE GLUCONATE 1 APPLICATION(S): 213 SOLUTION TOPICAL at 20:00

## 2020-11-22 RX ADMIN — CHLORHEXIDINE GLUCONATE 1 APPLICATION(S): 213 SOLUTION TOPICAL at 21:48

## 2020-11-22 RX ADMIN — SODIUM CHLORIDE 3 MILLILITER(S): 9 INJECTION INTRAMUSCULAR; INTRAVENOUS; SUBCUTANEOUS at 21:18

## 2020-11-22 RX ADMIN — Medication 25 MILLIGRAM(S): at 17:55

## 2020-11-22 RX ADMIN — HEPARIN SODIUM 10 UNIT(S)/HR: 5000 INJECTION INTRAVENOUS; SUBCUTANEOUS at 21:18

## 2020-11-22 RX ADMIN — Medication 50 MILLIGRAM(S): at 21:46

## 2020-11-22 RX ADMIN — ATORVASTATIN CALCIUM 80 MILLIGRAM(S): 80 TABLET, FILM COATED ORAL at 21:46

## 2020-11-22 NOTE — PROGRESS NOTE ADULT - ASSESSMENT
This is a 71 y/o M, former 7 pack year smoker (cigarettes and marijuana), with a past medical history significant for HTN, HLD, (remote prior PATY history formerly on CPAP resolved with diet/exercise), s/p cardiac catheterization in 2006 from referred left shoulder pain (findings 40% pLAD, 70% mLCx, 70% PDA determined unable to stent with adequate collateral). In May 2020, patient incidentally found to have CAD/NSTEMI after a hospital/ICU admission for babesiosis. NST 6/2020 revealed small, fixed, mild to moderate basal inferolateral defect with normal wall motion. Patient is followed by Dr. Partida, cardiology, who he presented to 11/11/2020 after several weeks of left sided chest pain with exertion (never at rest), described as a dull ache that resolves with rest. Pt went for an elective cardiac catheterization 11/19/2020 at [facility] the revealed 3 vessel disease with left main disease. Pt transferred to [facility] for surgical evaluation by Dr. Cunningham, and presurgical workup for CABG. Plan is OR on Monday.     Plan:  Neurovascular:   -Stable    Cardiovascular:   -pre-op for CABG on Monday with Dr. Cunningham   -on hep gtt for CAD, pending PTT   -c/w BB, Imdur, ASA, statin   -PST completed: orders placed, NPO at midnight, consent in chart, bloods on hold   -no need for ABG per Dr. Cunningham   *of note, type and screens showing "cold auto antibody" (Dr. Cunningham aware and blood bank confirmed they have correct blood on hand)*  -Hemodynamically stable.   -Monitor: BP, HR, tele    Respiratory:   -Oxygenating well on room air  -Encourage continued use of IS 10x/hr and frequent ambulation  -CXR: no acute pathology, no PTX     GI:  -GI PPX: pantoprazole 40mg daily   -PO Diet: DASH/TLC   -Bowel Regimen: senna, PRN miralax     Renal / :  -Continue to monitor renal function: BUN/Cr 14/0.92  -Monitor I/O's daily     Endocrine:    -No hx of DM or thyroid dx  -A1c: 5.3  -TSH: 2.73     Hematologic:  -CBC: H/H- 13/37  -Coagulation Panel: WNL   -on hep gtt, pending PTT     ID:  -Temperature: afebrile   -CBC: WBC- 6.6  -Continue to observe for SIRS/Sepsis Syndrome.    Prophylaxis:  -DVT prophylaxis with hep gtt (pending PTT, adjust dose as needed)  -Continue with SCD's b/l while patient is at rest     Disposition:  -OR Monday with Dr. Cunningham for CABG

## 2020-11-22 NOTE — PROGRESS NOTE ADULT - SUBJECTIVE AND OBJECTIVE BOX
Planned Date of Surgery:   11/23/20                                                                                                               Surgeon: Dr. Cunningham     Procedure: CABG     HPI:  This is a 69 y/o M, former 7 pack year smoker (cigarettes and marijuana), with a past medical history significant for HTN, HLD, (remote prior PATY history formerly on CPAP resolved with diet/exercise), s/p cardiac catheterization in 2006 from referred left shoulder pain (findings 40% pLAD, 70% mLCx, 70% PDA determined unable to stent with adequate collateral). In May 2020, patient incidentally found to have CAD/NSTEMI after a hospital/ICU admission for babesiosis. NST 6/2020 revealed small, fixed, mild to moderate basal inferolateral defect with normal wall motion. Patient is followed by Dr. Partida, cardiology, who he presented to 11/11/2020 after several weeks of left sided chest pain with exertion (never at rest), described as a dull ache that resolves with rest. Pt went for an elective cardiac catheterization 11/19/2020 at [facility] the revealed 3 vessel disease with left main disease. Pt transferred to [facility] for surgical evaluation by Dr. Cunningham, and presurgical workup for CABG. Plan is OR on Monday    PAST MEDICAL & SURGICAL HISTORY:  Non-ST elevation MI (NSTEMI)  3-vessel CAD  HLD (hyperlipidemia)  HTN (hypertension)  S/P tonsillectomy  S/P laser trabeculoplasty of eye  S/P right inguinal hernia repair  S/p bilateral carpal tunnel release  S/P rotator cuff surgery    Allergy Status Unknown    Physical Exam  T(C): 36.2 (11-22-20 @ 10:00), Max: 36.7 (11-21-20 @ 21:50)  HR: 60 (11-22-20 @ 08:41) (56 - 66)  BP: 126/73 (11-22-20 @ 08:41) (123/68 - 153/85)  RR: 19 (11-22-20 @ 08:41) (16 - 19)  SpO2: 98% (11-22-20 @ 08:41) (95% - 98%)  Constitutional: well appearing sitting in chair in NAD   Neuro: AOx3, motor skills grossly intact  CV: Normal s1/s2, no murmurs  Pulmonary: lungs CTA B/l, no wheezing   GI: +BS 4 quadrants. soft, non-tender, non-distended  Extremities: strength 5/5 b/l upper/lower extremities.     MEDICATIONS  (STANDING):  aspirin enteric coated 81 milliGRAM(s) Oral daily  atorvastatin 80 milliGRAM(s) Oral at bedtime  buPROPion XL . 300 milliGRAM(s) Oral daily  chlorhexidine 0.12% Liquid 10 milliLiter(s) Swish and Spit once  chlorhexidine 4% Liquid 1 Application(s) Topical once  chlorhexidine 4% Liquid 1 Application(s) Topical once  heparin  Infusion 900 Unit(s)/Hr (9 mL/Hr) IV Continuous <Continuous>  isosorbide   mononitrate ER Tablet (IMDUR) 30 milliGRAM(s) Oral daily  metoprolol tartrate 25 milliGRAM(s) Oral every 12 hours  pantoprazole    Tablet 40 milliGRAM(s) Oral before breakfast  polyethylene glycol 3350 17 Gram(s) Oral daily  senna 2 Tablet(s) Oral at bedtime  sodium chloride 0.9% lock flush 3 milliLiter(s) IV Push every 8 hours  traZODone 50 milliGRAM(s) Oral at bedtime    MEDICATIONS  (PRN):    On Beta Blocker? yes    Labs:                        13.2   6.69  )-----------( 162      ( 22 Nov 2020 06:40 )             37.7     11-22    140  |  105  |  14  ----------------------------<  101<H>  4.2   |  27  |  0.92    Ca    9.3      22 Nov 2020 06:40  Mg     2.1     11-22      PTT - ( 22 Nov 2020 06:40 )  PTT:41.1 sec    ABO Interpretation: O (11-21-20 @ 19:57)    Hgb A1C: 5.3    EKG: in chart     CXR: < from: Xray Chest 1 View-PORTABLE IMMEDIATE (Xray Chest 1 View-PORTABLE IMMEDIATE .) (11.19.20 @ 17:54) >  Findings  impression: Cardiac magnification, thoracic aortic calcification. Lungs and mediastinum are unremarkable. Left clavicular and right rib old fractures,, right humeral surgical anchors.  < end of copied text >    CT Scans: n/a    Cath Report: 3-vessel CAD:  40% pLAD, 70% mLCx, 70% PDA     Echo: < from: TTE Echo Complete w/o Contrast w/ Doppler (11.20.20 @ 10:32) >  CONCLUSIONS:   1. Normal left and right ventricular size and systolic function.   2. No significant valvular disease.   3. No evidence of pulmonary hypertension, pulmonary artery systolic pressure is 20 mmHg.   4. No pericardial effusion.   5. No prior echo is available for comparison.  < end of copied text >    PFT's: bedside PFTs performed, report in chart    Carotid Duplex: c< from: US Duplex Carotid Arteries Complete, Bilateral (11.19.20 @ 19:47) >  IMPRESSION:  No hemodynamically significant stenosis of the bilateral carotid arteries.  Mild calcified and noncalcified plaque involving the bilateral bifurcations and proximal ICAs  Mild bilateralintimal medial thickening of the common carotid arteries left greater than right.  < end of copied text >    Consult in Chart?  YES   Consent in Chart? YES   Pre-op Orders Placed? YES   Blood Products Ordered? YES   NPO ordered? YES

## 2020-11-23 ENCOUNTER — APPOINTMENT (OUTPATIENT)
Dept: CARDIOTHORACIC SURGERY | Facility: HOSPITAL | Age: 70
End: 2020-11-23

## 2020-11-23 LAB
ALBUMIN SERPL ELPH-MCNC: 3 G/DL — LOW (ref 3.3–5)
ALP SERPL-CCNC: 42 U/L — SIGNIFICANT CHANGE UP (ref 40–120)
ALT FLD-CCNC: 19 U/L — SIGNIFICANT CHANGE UP (ref 10–45)
ANION GAP SERPL CALC-SCNC: 10 MMOL/L — SIGNIFICANT CHANGE UP (ref 5–17)
ANION GAP SERPL CALC-SCNC: 12 MMOL/L — SIGNIFICANT CHANGE UP (ref 5–17)
ANION GAP SERPL CALC-SCNC: 13 MMOL/L — SIGNIFICANT CHANGE UP (ref 5–17)
APTT BLD: 28 SEC — SIGNIFICANT CHANGE UP (ref 27.5–35.5)
APTT BLD: 28.7 SEC — SIGNIFICANT CHANGE UP (ref 27.5–35.5)
APTT BLD: 63 SEC — HIGH (ref 27.5–35.5)
AST SERPL-CCNC: 37 U/L — SIGNIFICANT CHANGE UP (ref 10–40)
BASE EXCESS BLDA CALC-SCNC: 0.8 MMOL/L — SIGNIFICANT CHANGE UP (ref -2–3)
BASOPHILS # BLD AUTO: 0.02 K/UL — SIGNIFICANT CHANGE UP (ref 0–0.2)
BASOPHILS NFR BLD AUTO: 0.2 % — SIGNIFICANT CHANGE UP (ref 0–2)
BILIRUB SERPL-MCNC: 0.7 MG/DL — SIGNIFICANT CHANGE UP (ref 0.2–1.2)
BUN SERPL-MCNC: 13 MG/DL — SIGNIFICANT CHANGE UP (ref 7–23)
BUN SERPL-MCNC: 15 MG/DL — SIGNIFICANT CHANGE UP (ref 7–23)
BUN SERPL-MCNC: 18 MG/DL — SIGNIFICANT CHANGE UP (ref 7–23)
CALCIUM SERPL-MCNC: 7.2 MG/DL — LOW (ref 8.4–10.5)
CALCIUM SERPL-MCNC: 7.5 MG/DL — LOW (ref 8.4–10.5)
CALCIUM SERPL-MCNC: 9.4 MG/DL — SIGNIFICANT CHANGE UP (ref 8.4–10.5)
CHLORIDE SERPL-SCNC: 101 MMOL/L — SIGNIFICANT CHANGE UP (ref 96–108)
CHLORIDE SERPL-SCNC: 101 MMOL/L — SIGNIFICANT CHANGE UP (ref 96–108)
CHLORIDE SERPL-SCNC: 106 MMOL/L — SIGNIFICANT CHANGE UP (ref 96–108)
CO2 SERPL-SCNC: 25 MMOL/L — SIGNIFICANT CHANGE UP (ref 22–31)
CO2 SERPL-SCNC: 25 MMOL/L — SIGNIFICANT CHANGE UP (ref 22–31)
CO2 SERPL-SCNC: 28 MMOL/L — SIGNIFICANT CHANGE UP (ref 22–31)
CREAT SERPL-MCNC: 0.66 MG/DL — SIGNIFICANT CHANGE UP (ref 0.5–1.3)
CREAT SERPL-MCNC: 0.75 MG/DL — SIGNIFICANT CHANGE UP (ref 0.5–1.3)
CREAT SERPL-MCNC: 0.97 MG/DL — SIGNIFICANT CHANGE UP (ref 0.5–1.3)
EOSINOPHIL # BLD AUTO: 0.02 K/UL — SIGNIFICANT CHANGE UP (ref 0–0.5)
EOSINOPHIL NFR BLD AUTO: 0.2 % — SIGNIFICANT CHANGE UP (ref 0–6)
GAS PNL BLDA: SIGNIFICANT CHANGE UP
GLUCOSE BLDC GLUCOMTR-MCNC: 110 MG/DL — HIGH (ref 70–99)
GLUCOSE BLDC GLUCOMTR-MCNC: 149 MG/DL — HIGH (ref 70–99)
GLUCOSE BLDC GLUCOMTR-MCNC: 155 MG/DL — HIGH (ref 70–99)
GLUCOSE BLDC GLUCOMTR-MCNC: 164 MG/DL — HIGH (ref 70–99)
GLUCOSE BLDC GLUCOMTR-MCNC: 176 MG/DL — HIGH (ref 70–99)
GLUCOSE SERPL-MCNC: 106 MG/DL — HIGH (ref 70–99)
GLUCOSE SERPL-MCNC: 176 MG/DL — HIGH (ref 70–99)
GLUCOSE SERPL-MCNC: 202 MG/DL — HIGH (ref 70–99)
HCO3 BLDA-SCNC: 25 MMOL/L — SIGNIFICANT CHANGE UP (ref 21–28)
HCT VFR BLD CALC: 29.8 % — LOW (ref 39–50)
HCT VFR BLD CALC: 31.5 % — LOW (ref 39–50)
HCT VFR BLD CALC: 40 % — SIGNIFICANT CHANGE UP (ref 39–50)
HGB BLD-MCNC: 10.2 G/DL — LOW (ref 13–17)
HGB BLD-MCNC: 11.1 G/DL — LOW (ref 13–17)
HGB BLD-MCNC: 13.6 G/DL — SIGNIFICANT CHANGE UP (ref 13–17)
IMM GRANULOCYTES NFR BLD AUTO: 0.7 % — SIGNIFICANT CHANGE UP (ref 0–1.5)
INR BLD: 1.11 — SIGNIFICANT CHANGE UP (ref 0.88–1.16)
INR BLD: 1.2 — HIGH (ref 0.88–1.16)
INR BLD: 1.2 — HIGH (ref 0.88–1.16)
LACTATE SERPL-SCNC: 1.7 MMOL/L — SIGNIFICANT CHANGE UP (ref 0.5–2)
LACTATE SERPL-SCNC: 2.2 MMOL/L — HIGH (ref 0.5–2)
LYMPHOCYTES # BLD AUTO: 2.28 K/UL — SIGNIFICANT CHANGE UP (ref 1–3.3)
LYMPHOCYTES # BLD AUTO: 20.6 % — SIGNIFICANT CHANGE UP (ref 13–44)
MAGNESIUM SERPL-MCNC: 1.9 MG/DL — SIGNIFICANT CHANGE UP (ref 1.6–2.6)
MAGNESIUM SERPL-MCNC: 2.2 MG/DL — SIGNIFICANT CHANGE UP (ref 1.6–2.6)
MAGNESIUM SERPL-MCNC: 2.4 MG/DL — SIGNIFICANT CHANGE UP (ref 1.6–2.6)
MCHC RBC-ENTMCNC: 32.7 PG — SIGNIFICANT CHANGE UP (ref 27–34)
MCHC RBC-ENTMCNC: 33 PG — SIGNIFICANT CHANGE UP (ref 27–34)
MCHC RBC-ENTMCNC: 33.1 PG — SIGNIFICANT CHANGE UP (ref 27–34)
MCHC RBC-ENTMCNC: 34 GM/DL — SIGNIFICANT CHANGE UP (ref 32–36)
MCHC RBC-ENTMCNC: 34.2 GM/DL — SIGNIFICANT CHANGE UP (ref 32–36)
MCHC RBC-ENTMCNC: 35.2 GM/DL — SIGNIFICANT CHANGE UP (ref 32–36)
MCV RBC AUTO: 94 FL — SIGNIFICANT CHANGE UP (ref 80–100)
MCV RBC AUTO: 95.5 FL — SIGNIFICANT CHANGE UP (ref 80–100)
MCV RBC AUTO: 97.1 FL — SIGNIFICANT CHANGE UP (ref 80–100)
MONOCYTES # BLD AUTO: 0.78 K/UL — SIGNIFICANT CHANGE UP (ref 0–0.9)
MONOCYTES NFR BLD AUTO: 7 % — SIGNIFICANT CHANGE UP (ref 2–14)
NEUTROPHILS # BLD AUTO: 7.9 K/UL — HIGH (ref 1.8–7.4)
NEUTROPHILS NFR BLD AUTO: 71.3 % — SIGNIFICANT CHANGE UP (ref 43–77)
NRBC # BLD: 0 /100 WBCS — SIGNIFICANT CHANGE UP (ref 0–0)
PCO2 BLDA: 41 MMHG — SIGNIFICANT CHANGE UP (ref 35–48)
PH BLDA: 7.41 — SIGNIFICANT CHANGE UP (ref 7.35–7.45)
PHOSPHATE SERPL-MCNC: 2.2 MG/DL — LOW (ref 2.5–4.5)
PHOSPHATE SERPL-MCNC: 3.9 MG/DL — SIGNIFICANT CHANGE UP (ref 2.5–4.5)
PLATELET # BLD AUTO: 113 K/UL — LOW (ref 150–400)
PLATELET # BLD AUTO: 139 K/UL — LOW (ref 150–400)
PLATELET # BLD AUTO: 174 K/UL — SIGNIFICANT CHANGE UP (ref 150–400)
PO2 BLDA: 90 MMHG — SIGNIFICANT CHANGE UP (ref 83–108)
POTASSIUM SERPL-MCNC: 3.7 MMOL/L — SIGNIFICANT CHANGE UP (ref 3.5–5.3)
POTASSIUM SERPL-MCNC: 3.8 MMOL/L — SIGNIFICANT CHANGE UP (ref 3.5–5.3)
POTASSIUM SERPL-MCNC: 4.1 MMOL/L — SIGNIFICANT CHANGE UP (ref 3.5–5.3)
POTASSIUM SERPL-SCNC: 3.7 MMOL/L — SIGNIFICANT CHANGE UP (ref 3.5–5.3)
POTASSIUM SERPL-SCNC: 3.8 MMOL/L — SIGNIFICANT CHANGE UP (ref 3.5–5.3)
POTASSIUM SERPL-SCNC: 4.1 MMOL/L — SIGNIFICANT CHANGE UP (ref 3.5–5.3)
PROT SERPL-MCNC: 5.2 G/DL — LOW (ref 6–8.3)
PROTHROM AB SERPL-ACNC: 13.2 SEC — SIGNIFICANT CHANGE UP (ref 10.6–13.6)
PROTHROM AB SERPL-ACNC: 14.3 SEC — HIGH (ref 10.6–13.6)
PROTHROM AB SERPL-ACNC: 14.3 SEC — HIGH (ref 10.6–13.6)
RBC # BLD: 3.12 M/UL — LOW (ref 4.2–5.8)
RBC # BLD: 3.35 M/UL — LOW (ref 4.2–5.8)
RBC # BLD: 4.12 M/UL — LOW (ref 4.2–5.8)
RBC # FLD: 11.9 % — SIGNIFICANT CHANGE UP (ref 10.3–14.5)
RBC # FLD: 12 % — SIGNIFICANT CHANGE UP (ref 10.3–14.5)
RBC # FLD: 12.1 % — SIGNIFICANT CHANGE UP (ref 10.3–14.5)
SAO2 % BLDA: 96 % — SIGNIFICANT CHANGE UP (ref 95–100)
SODIUM SERPL-SCNC: 139 MMOL/L — SIGNIFICANT CHANGE UP (ref 135–145)
SODIUM SERPL-SCNC: 139 MMOL/L — SIGNIFICANT CHANGE UP (ref 135–145)
SODIUM SERPL-SCNC: 143 MMOL/L — SIGNIFICANT CHANGE UP (ref 135–145)
WBC # BLD: 11.08 K/UL — HIGH (ref 3.8–10.5)
WBC # BLD: 6.52 K/UL — SIGNIFICANT CHANGE UP (ref 3.8–10.5)
WBC # BLD: 8.54 K/UL — SIGNIFICANT CHANGE UP (ref 3.8–10.5)
WBC # FLD AUTO: 11.08 K/UL — HIGH (ref 3.8–10.5)
WBC # FLD AUTO: 6.52 K/UL — SIGNIFICANT CHANGE UP (ref 3.8–10.5)
WBC # FLD AUTO: 8.54 K/UL — SIGNIFICANT CHANGE UP (ref 3.8–10.5)

## 2020-11-23 PROCEDURE — 33533 CABG ARTERIAL SINGLE: CPT

## 2020-11-23 PROCEDURE — 33519 CABG ARTERY-VEIN THREE: CPT

## 2020-11-23 PROCEDURE — 71045 X-RAY EXAM CHEST 1 VIEW: CPT | Mod: 26

## 2020-11-23 PROCEDURE — 99291 CRITICAL CARE FIRST HOUR: CPT

## 2020-11-23 PROCEDURE — 99292 CRITICAL CARE ADDL 30 MIN: CPT

## 2020-11-23 PROCEDURE — 93010 ELECTROCARDIOGRAM REPORT: CPT

## 2020-11-23 RX ORDER — BUPROPION HYDROCHLORIDE 150 MG/1
300 TABLET, EXTENDED RELEASE ORAL DAILY
Refills: 0 | Status: DISCONTINUED | OUTPATIENT
Start: 2020-11-23 | End: 2020-11-27

## 2020-11-23 RX ORDER — MAGNESIUM SULFATE 500 MG/ML
2 VIAL (ML) INJECTION ONCE
Refills: 0 | Status: COMPLETED | OUTPATIENT
Start: 2020-11-23 | End: 2020-11-24

## 2020-11-23 RX ORDER — POTASSIUM PHOSPHATE, MONOBASIC POTASSIUM PHOSPHATE, DIBASIC 236; 224 MG/ML; MG/ML
15 INJECTION, SOLUTION INTRAVENOUS ONCE
Refills: 0 | Status: COMPLETED | OUTPATIENT
Start: 2020-11-23 | End: 2020-11-23

## 2020-11-23 RX ORDER — CHLORHEXIDINE GLUCONATE 213 G/1000ML
15 SOLUTION TOPICAL EVERY 12 HOURS
Refills: 0 | Status: DISCONTINUED | OUTPATIENT
Start: 2020-11-23 | End: 2020-11-23

## 2020-11-23 RX ORDER — CALCIUM GLUCONATE 100 MG/ML
2 VIAL (ML) INTRAVENOUS ONCE
Refills: 0 | Status: COMPLETED | OUTPATIENT
Start: 2020-11-23 | End: 2020-11-23

## 2020-11-23 RX ORDER — DEXTROSE 50 % IN WATER 50 %
25 SYRINGE (ML) INTRAVENOUS
Refills: 0 | Status: DISCONTINUED | OUTPATIENT
Start: 2020-11-23 | End: 2020-11-25

## 2020-11-23 RX ORDER — NICARDIPINE HYDROCHLORIDE 30 MG/1
5 CAPSULE, EXTENDED RELEASE ORAL
Qty: 40 | Refills: 0 | Status: DISCONTINUED | OUTPATIENT
Start: 2020-11-23 | End: 2020-11-24

## 2020-11-23 RX ORDER — FENTANYL CITRATE 50 UG/ML
25 INJECTION INTRAVENOUS ONCE
Refills: 0 | Status: DISCONTINUED | OUTPATIENT
Start: 2020-11-23 | End: 2020-11-23

## 2020-11-23 RX ORDER — SENNA PLUS 8.6 MG/1
2 TABLET ORAL AT BEDTIME
Refills: 0 | Status: DISCONTINUED | OUTPATIENT
Start: 2020-11-23 | End: 2020-11-27

## 2020-11-23 RX ORDER — ASPIRIN/CALCIUM CARB/MAGNESIUM 324 MG
81 TABLET ORAL DAILY
Refills: 0 | Status: DISCONTINUED | OUTPATIENT
Start: 2020-11-23 | End: 2020-11-27

## 2020-11-23 RX ORDER — ACETAMINOPHEN 500 MG
1000 TABLET ORAL ONCE
Refills: 0 | Status: COMPLETED | OUTPATIENT
Start: 2020-11-23 | End: 2020-11-23

## 2020-11-23 RX ORDER — POTASSIUM CHLORIDE 20 MEQ
20 PACKET (EA) ORAL ONCE
Refills: 0 | Status: COMPLETED | OUTPATIENT
Start: 2020-11-23 | End: 2020-11-23

## 2020-11-23 RX ORDER — ACETAMINOPHEN 500 MG
650 TABLET ORAL EVERY 4 HOURS
Refills: 0 | Status: DISCONTINUED | OUTPATIENT
Start: 2020-11-23 | End: 2020-11-27

## 2020-11-23 RX ORDER — TRAZODONE HCL 50 MG
50 TABLET ORAL AT BEDTIME
Refills: 0 | Status: DISCONTINUED | OUTPATIENT
Start: 2020-11-23 | End: 2020-11-27

## 2020-11-23 RX ORDER — DEXMEDETOMIDINE HYDROCHLORIDE IN 0.9% SODIUM CHLORIDE 4 UG/ML
0.2 INJECTION INTRAVENOUS
Qty: 400 | Refills: 0 | Status: DISCONTINUED | OUTPATIENT
Start: 2020-11-23 | End: 2020-11-23

## 2020-11-23 RX ORDER — MEPERIDINE HYDROCHLORIDE 50 MG/ML
25 INJECTION INTRAMUSCULAR; INTRAVENOUS; SUBCUTANEOUS ONCE
Refills: 0 | Status: DISCONTINUED | OUTPATIENT
Start: 2020-11-23 | End: 2020-11-23

## 2020-11-23 RX ORDER — CHLORHEXIDINE GLUCONATE 213 G/1000ML
5 SOLUTION TOPICAL
Refills: 0 | Status: DISCONTINUED | OUTPATIENT
Start: 2020-11-23 | End: 2020-11-23

## 2020-11-23 RX ORDER — OXYCODONE AND ACETAMINOPHEN 5; 325 MG/1; MG/1
1 TABLET ORAL EVERY 4 HOURS
Refills: 0 | Status: DISCONTINUED | OUTPATIENT
Start: 2020-11-23 | End: 2020-11-27

## 2020-11-23 RX ORDER — NICARDIPINE HYDROCHLORIDE 30 MG/1
5 CAPSULE, EXTENDED RELEASE ORAL
Qty: 40 | Refills: 0 | Status: DISCONTINUED | OUTPATIENT
Start: 2020-11-23 | End: 2020-11-23

## 2020-11-23 RX ORDER — CHLORHEXIDINE GLUCONATE 213 G/1000ML
1 SOLUTION TOPICAL DAILY
Refills: 0 | Status: DISCONTINUED | OUTPATIENT
Start: 2020-11-23 | End: 2020-11-25

## 2020-11-23 RX ORDER — FAMOTIDINE 10 MG/ML
20 INJECTION INTRAVENOUS EVERY 12 HOURS
Refills: 0 | Status: DISCONTINUED | OUTPATIENT
Start: 2020-11-23 | End: 2020-11-23

## 2020-11-23 RX ORDER — DEXTROSE 50 % IN WATER 50 %
50 SYRINGE (ML) INTRAVENOUS
Refills: 0 | Status: DISCONTINUED | OUTPATIENT
Start: 2020-11-23 | End: 2020-11-25

## 2020-11-23 RX ORDER — FUROSEMIDE 40 MG
20 TABLET ORAL ONCE
Refills: 0 | Status: COMPLETED | OUTPATIENT
Start: 2020-11-23 | End: 2020-11-23

## 2020-11-23 RX ORDER — INSULIN HUMAN 100 [IU]/ML
1 INJECTION, SOLUTION SUBCUTANEOUS
Qty: 50 | Refills: 0 | Status: DISCONTINUED | OUTPATIENT
Start: 2020-11-23 | End: 2020-11-24

## 2020-11-23 RX ORDER — CEFAZOLIN SODIUM 1 G
2000 VIAL (EA) INJECTION EVERY 8 HOURS
Refills: 0 | Status: COMPLETED | OUTPATIENT
Start: 2020-11-23 | End: 2020-11-24

## 2020-11-23 RX ORDER — SODIUM CHLORIDE 9 MG/ML
1000 INJECTION INTRAMUSCULAR; INTRAVENOUS; SUBCUTANEOUS
Refills: 0 | Status: DISCONTINUED | OUTPATIENT
Start: 2020-11-23 | End: 2020-11-25

## 2020-11-23 RX ORDER — NOREPINEPHRINE BITARTRATE/D5W 8 MG/250ML
0.05 PLASTIC BAG, INJECTION (ML) INTRAVENOUS
Qty: 8 | Refills: 0 | Status: DISCONTINUED | OUTPATIENT
Start: 2020-11-23 | End: 2020-11-23

## 2020-11-23 RX ORDER — ALBUMIN HUMAN 25 %
250 VIAL (ML) INTRAVENOUS
Refills: 0 | Status: COMPLETED | OUTPATIENT
Start: 2020-11-23 | End: 2020-11-23

## 2020-11-23 RX ORDER — POTASSIUM CHLORIDE 20 MEQ
20 PACKET (EA) ORAL
Refills: 0 | Status: COMPLETED | OUTPATIENT
Start: 2020-11-23 | End: 2020-11-24

## 2020-11-23 RX ORDER — HEPARIN SODIUM 5000 [USP'U]/ML
5000 INJECTION INTRAVENOUS; SUBCUTANEOUS EVERY 8 HOURS
Refills: 0 | Status: DISCONTINUED | OUTPATIENT
Start: 2020-11-23 | End: 2020-11-27

## 2020-11-23 RX ORDER — ATORVASTATIN CALCIUM 80 MG/1
80 TABLET, FILM COATED ORAL AT BEDTIME
Refills: 0 | Status: DISCONTINUED | OUTPATIENT
Start: 2020-11-23 | End: 2020-11-27

## 2020-11-23 RX ORDER — OXYCODONE AND ACETAMINOPHEN 5; 325 MG/1; MG/1
2 TABLET ORAL EVERY 6 HOURS
Refills: 0 | Status: DISCONTINUED | OUTPATIENT
Start: 2020-11-23 | End: 2020-11-27

## 2020-11-23 RX ADMIN — FAMOTIDINE 20 MILLIGRAM(S): 10 INJECTION INTRAVENOUS at 18:30

## 2020-11-23 RX ADMIN — Medication 500 MILLILITER(S): at 18:00

## 2020-11-23 RX ADMIN — Medication 100 MILLIEQUIVALENT(S): at 15:15

## 2020-11-23 RX ADMIN — INSULIN HUMAN 1 UNIT(S)/HR: 100 INJECTION, SOLUTION SUBCUTANEOUS at 19:30

## 2020-11-23 RX ADMIN — Medication 7.74 MICROGRAM(S)/KG/MIN: at 13:30

## 2020-11-23 RX ADMIN — NICARDIPINE HYDROCHLORIDE 25 MG/HR: 30 CAPSULE, EXTENDED RELEASE ORAL at 19:00

## 2020-11-23 RX ADMIN — Medication 1000 MILLIGRAM(S): at 19:30

## 2020-11-23 RX ADMIN — Medication 200 GRAM(S): at 23:44

## 2020-11-23 RX ADMIN — FENTANYL CITRATE 25 MICROGRAM(S): 50 INJECTION INTRAVENOUS at 13:30

## 2020-11-23 RX ADMIN — CHLORHEXIDINE GLUCONATE 10 MILLILITER(S): 213 SOLUTION TOPICAL at 05:46

## 2020-11-23 RX ADMIN — SENNA PLUS 2 TABLET(S): 8.6 TABLET ORAL at 21:49

## 2020-11-23 RX ADMIN — ATORVASTATIN CALCIUM 80 MILLIGRAM(S): 80 TABLET, FILM COATED ORAL at 21:49

## 2020-11-23 RX ADMIN — Medication 20 MILLIGRAM(S): at 18:30

## 2020-11-23 RX ADMIN — OXYCODONE AND ACETAMINOPHEN 1 TABLET(S): 5; 325 TABLET ORAL at 22:30

## 2020-11-23 RX ADMIN — Medication 100 MILLIEQUIVALENT(S): at 22:36

## 2020-11-23 RX ADMIN — Medication 50 MILLIGRAM(S): at 21:55

## 2020-11-23 RX ADMIN — DEXMEDETOMIDINE HYDROCHLORIDE IN 0.9% SODIUM CHLORIDE 4.13 MICROGRAM(S)/KG/HR: 4 INJECTION INTRAVENOUS at 14:15

## 2020-11-23 RX ADMIN — Medication 100 MILLIGRAM(S): at 19:00

## 2020-11-23 RX ADMIN — CHLORHEXIDINE GLUCONATE 5 MILLILITER(S): 213 SOLUTION TOPICAL at 18:24

## 2020-11-23 RX ADMIN — POTASSIUM PHOSPHATE, MONOBASIC POTASSIUM PHOSPHATE, DIBASIC 62.5 MILLIMOLE(S): 236; 224 INJECTION, SOLUTION INTRAVENOUS at 15:30

## 2020-11-23 RX ADMIN — SODIUM CHLORIDE 3 MILLILITER(S): 9 INJECTION INTRAMUSCULAR; INTRAVENOUS; SUBCUTANEOUS at 05:02

## 2020-11-23 RX ADMIN — Medication 400 MILLIGRAM(S): at 19:00

## 2020-11-23 RX ADMIN — FENTANYL CITRATE 25 MICROGRAM(S): 50 INJECTION INTRAVENOUS at 13:45

## 2020-11-23 RX ADMIN — Medication 500 MILLILITER(S): at 17:30

## 2020-11-23 RX ADMIN — CHLORHEXIDINE GLUCONATE 1 APPLICATION(S): 213 SOLUTION TOPICAL at 04:33

## 2020-11-23 RX ADMIN — OXYCODONE AND ACETAMINOPHEN 1 TABLET(S): 5; 325 TABLET ORAL at 21:49

## 2020-11-23 RX ADMIN — Medication 200 GRAM(S): at 14:00

## 2020-11-23 RX ADMIN — Medication 100 MILLIGRAM(S): at 11:30

## 2020-11-23 NOTE — BRIEF OPERATIVE NOTE - COMMENTS
EF: EF: NL  I first assisted for the entirety of the case, including but not limited to conduit harvest, cannulation, distal/proximal anastomoses, decannulation, and chest closure.

## 2020-11-23 NOTE — BRIEF OPERATIVE NOTE - NSICDXBRIEFPROCEDURE_GEN_ALL_CORE_FT
PROCEDURES:  CABG, with TRISTAN 23-Nov-2020 13:26:05 LASSITER-LAD, SVG-Ramus, OM, PDA Adali Franco

## 2020-11-23 NOTE — PROGRESS NOTE ADULT - SUBJECTIVE AND OBJECTIVE BOX
CTICU  CRITICAL  CARE  attending     Hand off received 					   Pertinent clinical, laboratory, radiographic, hemodynamic, echocardiographic, respiratory data, microbiologic data and chart were reviewed and analyzed frequently throughout the course of the day and night    70 year old male with HTN,HLD, former 7 pack year smoker (cigarettes and marijuana), (remote prior PATY history formerly on CPAP resolved with diet/exercise),    In May 2020, patient incidentally found to have CAD/NSTEMI after a hospital/ICU admission for babesiosis.   NST 6/2020 revealed small, fixed, mild to moderate basal inferolateral defect with normal wall motion.  The patient is followed by Dr. Partida, cardiology. The patient presented to his office 11/11/2020 after several weeks of left sided chest pain with exertion (never at rest).  He describes the pain as a dull ache that resolves with rest. Patient reports he is able to walk several flights of stairs prior without pain.   The patient went for an elective cardiac catheterization 11/19/2020 at [facility] that revealed 3 vessel disease with left main disease.   S/p CABG.      FAMILY HISTORY:  FH: hyperlipidemia  Family history of early CAD  mom s/p CABG at age 65  FH: Alzheimers disease  FH: type 2 diabetes    PAST MEDICAL & SURGICAL HISTORY:  Non-ST elevation MI (NSTEMI)  3-vessel CAD  HLD (hyperlipidemia)  HTN (hypertension)  S/P tonsillectomy  S/P laser trabeculoplasty of eye  S/P right inguinal hernia repair  S/p bilateral carpal tunnel release  S/P rotator cuff surgery          14 system review was unremarkable      Vital signs, hemodynamic and respiratory parameters were reviewed from the bedside nursing flow sheet.  ICU Vital Signs Last 24 Hrs  T(C): 36.3 (23 Nov 2020 21:12), Max: 36.6 (23 Nov 2020 01:12)  T(F): 97.4 (23 Nov 2020 21:12), Max: 97.8 (23 Nov 2020 01:12)  HR: 74 (23 Nov 2020 22:00) (53 - 74)  BP: 147/81 (23 Nov 2020 05:36) (147/81 - 159/74)  BP(mean): 109 (23 Nov 2020 05:06) (107 - 109)  ABP: 143/61 (23 Nov 2020 22:00) (94/47 - 148/71)  ABP(mean): 86 (23 Nov 2020 22:00) (60 - 95)  RR: 14 (23 Nov 2020 22:00) (11 - 20)  SpO2: 90% (23 Nov 2020 22:00) (90% - 100%)    Adult Advanced Hemodynamics Last 24 Hrs  CVP(mm Hg): 8 (23 Nov 2020 22:00) (3 - 17)  CVP(cm H2O): --  CO: --  CI: --  PA: --  PA(mean): --  PCWP: --  SVR: --  SVRI: --  PVR: --  PVRI: --, ABG - ( 23 Nov 2020 21:30 )  pH, Arterial: 7.38  pH, Blood: x     /  pCO2: 44    /  pO2: 64    / HCO3: 26    / Base Excess: 0.1   /  SaO2: 90                Mode: CPAP with PS  FiO2: 40  PEEP: 5  PS: 10  MAP: 9  PIP: 18    Intake and output was reviewed and the fluid balance was calculated  Daily Height in cm: 167.6 (23 Nov 2020 05:36)    Daily   I&O's Summary    22 Nov 2020 07:01  -  23 Nov 2020 07:00  --------------------------------------------------------  IN: 666 mL / OUT: 0 mL / NET: 666 mL    23 Nov 2020 07:01  -  23 Nov 2020 22:02  --------------------------------------------------------  IN: 3281.3 mL / OUT: 2050 mL / NET: 1231.3 mL        All lines and drain sites were assessed    Neuro: No change in the mental status from the baseline. Follows commands. Moves all 4 extremities.  Neck: No JVD.  CVS: S1, S2, No S3.  Lungs: Good air entry bilaterally.  Abd: Soft. No tenderness. + Bowel sounds.  Vascular: + DP/PT.  Extremities: No edema.  Lymphatic: Normal.  Skin: No abnormalities.      labs  CBC Full  -  ( 23 Nov 2020 21:29 )  WBC Count : 8.54 K/uL  RBC Count : 3.12 M/uL  Hemoglobin : 10.2 g/dL  Hematocrit : 29.8 %  Platelet Count - Automated : 113 K/uL  Mean Cell Volume : 95.5 fl  Mean Cell Hemoglobin : 32.7 pg  Mean Cell Hemoglobin Concentration : 34.2 gm/dL  Auto Neutrophil # : x  Auto Lymphocyte # : x  Auto Monocyte # : x  Auto Eosinophil # : x  Auto Basophil # : x  Auto Neutrophil % : x  Auto Lymphocyte % : x  Auto Monocyte % : x  Auto Eosinophil % : x  Auto Basophil % : x    11-23    143  |  106  |  15  ----------------------------<  202<H>  3.8   |  25  |  0.75    Ca    7.2<L>      23 Nov 2020 13:42  Phos  2.2     11-23  Mg     2.4     11-23    TPro  5.2<L>  /  Alb  3.0<L>  /  TBili  0.7  /  DBili  x   /  AST  37  /  ALT  19  /  AlkPhos  42  11-23    PT/INR - ( 23 Nov 2020 21:29 )   PT: 14.3 sec;   INR: 1.20          PTT - ( 23 Nov 2020 21:29 )  PTT:28.7 sec  The current medications were reviewed   MEDICATIONS  (STANDING):  aspirin enteric coated 81 milliGRAM(s) Oral daily  atorvastatin 80 milliGRAM(s) Oral at bedtime  buPROPion XL . 300 milliGRAM(s) Oral daily  ceFAZolin   IVPB 2000 milliGRAM(s) IV Intermittent every 8 hours  chlorhexidine 0.12% Liquid 5 milliLiter(s) Oral Mucosa two times a day  chlorhexidine 2% Cloths 1 Application(s) Topical daily  dextrose 50% Injectable 50 milliLiter(s) IV Push every 15 minutes  dextrose 50% Injectable 25 milliLiter(s) IV Push every 15 minutes  heparin   Injectable 5000 Unit(s) SubCutaneous every 8 hours  insulin regular Infusion 1 Unit(s)/Hr (1 mL/Hr) IV Continuous <Continuous>  niCARdipine Infusion 5 mG/Hr (25 mL/Hr) IV Continuous <Continuous>  senna 2 Tablet(s) Oral at bedtime  sodium chloride 0.9%. 1000 milliLiter(s) (10 mL/Hr) IV Continuous <Continuous>  traZODone 50 milliGRAM(s) Oral at bedtime    MEDICATIONS  (PRN):  acetaminophen   Tablet .. 650 milliGRAM(s) Oral every 4 hours PRN Mild Pain (1 - 3)  oxycodone    5 mG/acetaminophen 325 mG 1 Tablet(s) Oral every 4 hours PRN Moderate Pain (4 - 6)  oxycodone    5 mG/acetaminophen 325 mG 2 Tablet(s) Oral every 6 hours PRN Severe Pain (7 - 10)        S/P CABG  Uncontrolled DM.  Hemodynamically stable.  Good oxygenation.  Fair urine out put.  Overall doing well.      My plan includes :  OPTIMIZE Glycemic Control.   Statin and Betablocker.  Dual antiplatelet Rx.  Close hemodynamic, ventilatory and drain monitoring and management  Monitor for arrhythmias and monitor parameters for organ perfusion  Monitor neurologic status  Monitor renal function.  Head of the bed should remain elevated to 45 deg .   Chest PT and IS will be encouraged  Monitor adequacy of oxygenation and ventilation and attempt to wean oxygen  Nutritional goals will be met using po eventually , ensure adequate caloric intake and monitor the same  Stress ulcer and VTE prophylaxis will be achieved.  Electrolytes have been repleted as necessary and wound care has been carried out. Pain control has been achieved.   Aggressive physical therapy and early mobility and ambulation goals will be met   The family was updated about the course and plan  CRITICAL CARE TIME SPENT in evaluation and management, reassessments, review and interpretation of labs and x-rays, ventilator and hemodynamic management, formulating a plan and coordinating care: ___30____ MIN.  Time does not include procedural time.  CTICU ATTENDING     					    Tobi Pike MD

## 2020-11-24 LAB
ANION GAP SERPL CALC-SCNC: 10 MMOL/L — SIGNIFICANT CHANGE UP (ref 5–17)
ANION GAP SERPL CALC-SCNC: 11 MMOL/L — SIGNIFICANT CHANGE UP (ref 5–17)
ANION GAP SERPL CALC-SCNC: 9 MMOL/L — SIGNIFICANT CHANGE UP (ref 5–17)
APTT BLD: 25.7 SEC — LOW (ref 27.5–35.5)
BUN SERPL-MCNC: 11 MG/DL — SIGNIFICANT CHANGE UP (ref 7–23)
BUN SERPL-MCNC: 15 MG/DL — SIGNIFICANT CHANGE UP (ref 7–23)
BUN SERPL-MCNC: 17 MG/DL — SIGNIFICANT CHANGE UP (ref 7–23)
CALCIUM SERPL-MCNC: 8.1 MG/DL — LOW (ref 8.4–10.5)
CALCIUM SERPL-MCNC: 8.6 MG/DL — SIGNIFICANT CHANGE UP (ref 8.4–10.5)
CALCIUM SERPL-MCNC: 8.6 MG/DL — SIGNIFICANT CHANGE UP (ref 8.4–10.5)
CHLORIDE SERPL-SCNC: 101 MMOL/L — SIGNIFICANT CHANGE UP (ref 96–108)
CHLORIDE SERPL-SCNC: 95 MMOL/L — LOW (ref 96–108)
CHLORIDE SERPL-SCNC: 99 MMOL/L — SIGNIFICANT CHANGE UP (ref 96–108)
CO2 SERPL-SCNC: 24 MMOL/L — SIGNIFICANT CHANGE UP (ref 22–31)
CO2 SERPL-SCNC: 25 MMOL/L — SIGNIFICANT CHANGE UP (ref 22–31)
CO2 SERPL-SCNC: 29 MMOL/L — SIGNIFICANT CHANGE UP (ref 22–31)
CREAT SERPL-MCNC: 0.63 MG/DL — SIGNIFICANT CHANGE UP (ref 0.5–1.3)
CREAT SERPL-MCNC: 0.9 MG/DL — SIGNIFICANT CHANGE UP (ref 0.5–1.3)
CREAT SERPL-MCNC: 1 MG/DL — SIGNIFICANT CHANGE UP (ref 0.5–1.3)
GAS PNL BLDA: SIGNIFICANT CHANGE UP
GAS PNL BLDA: SIGNIFICANT CHANGE UP
GLUCOSE BLDC GLUCOMTR-MCNC: 112 MG/DL — HIGH (ref 70–99)
GLUCOSE BLDC GLUCOMTR-MCNC: 124 MG/DL — HIGH (ref 70–99)
GLUCOSE BLDC GLUCOMTR-MCNC: 129 MG/DL — HIGH (ref 70–99)
GLUCOSE BLDC GLUCOMTR-MCNC: 139 MG/DL — HIGH (ref 70–99)
GLUCOSE BLDC GLUCOMTR-MCNC: 139 MG/DL — HIGH (ref 70–99)
GLUCOSE BLDC GLUCOMTR-MCNC: 140 MG/DL — HIGH (ref 70–99)
GLUCOSE BLDC GLUCOMTR-MCNC: 143 MG/DL — HIGH (ref 70–99)
GLUCOSE BLDC GLUCOMTR-MCNC: 149 MG/DL — HIGH (ref 70–99)
GLUCOSE BLDC GLUCOMTR-MCNC: 149 MG/DL — HIGH (ref 70–99)
GLUCOSE BLDC GLUCOMTR-MCNC: 154 MG/DL — HIGH (ref 70–99)
GLUCOSE SERPL-MCNC: 123 MG/DL — HIGH (ref 70–99)
GLUCOSE SERPL-MCNC: 152 MG/DL — HIGH (ref 70–99)
GLUCOSE SERPL-MCNC: 160 MG/DL — HIGH (ref 70–99)
HCT VFR BLD CALC: 26.8 % — LOW (ref 39–50)
HCT VFR BLD CALC: 27.6 % — LOW (ref 39–50)
HCT VFR BLD CALC: 29.6 % — LOW (ref 39–50)
HGB BLD-MCNC: 10.2 G/DL — LOW (ref 13–17)
HGB BLD-MCNC: 9.1 G/DL — LOW (ref 13–17)
HGB BLD-MCNC: 9.5 G/DL — LOW (ref 13–17)
INR BLD: 1.14 — SIGNIFICANT CHANGE UP (ref 0.88–1.16)
LACTATE SERPL-SCNC: 1.3 MMOL/L — SIGNIFICANT CHANGE UP (ref 0.5–2)
MAGNESIUM SERPL-MCNC: 2 MG/DL — SIGNIFICANT CHANGE UP (ref 1.6–2.6)
MAGNESIUM SERPL-MCNC: 2.1 MG/DL — SIGNIFICANT CHANGE UP (ref 1.6–2.6)
MAGNESIUM SERPL-MCNC: 2.5 MG/DL — SIGNIFICANT CHANGE UP (ref 1.6–2.6)
MCHC RBC-ENTMCNC: 33.1 PG — SIGNIFICANT CHANGE UP (ref 27–34)
MCHC RBC-ENTMCNC: 33.2 PG — SIGNIFICANT CHANGE UP (ref 27–34)
MCHC RBC-ENTMCNC: 33.2 PG — SIGNIFICANT CHANGE UP (ref 27–34)
MCHC RBC-ENTMCNC: 34 GM/DL — SIGNIFICANT CHANGE UP (ref 32–36)
MCHC RBC-ENTMCNC: 34.4 GM/DL — SIGNIFICANT CHANGE UP (ref 32–36)
MCHC RBC-ENTMCNC: 34.5 GM/DL — SIGNIFICANT CHANGE UP (ref 32–36)
MCV RBC AUTO: 96.2 FL — SIGNIFICANT CHANGE UP (ref 80–100)
MCV RBC AUTO: 96.4 FL — SIGNIFICANT CHANGE UP (ref 80–100)
MCV RBC AUTO: 97.8 FL — SIGNIFICANT CHANGE UP (ref 80–100)
NRBC # BLD: 0 /100 WBCS — SIGNIFICANT CHANGE UP (ref 0–0)
PHOSPHATE SERPL-MCNC: 3.6 MG/DL — SIGNIFICANT CHANGE UP (ref 2.5–4.5)
PHOSPHATE SERPL-MCNC: 3.7 MG/DL — SIGNIFICANT CHANGE UP (ref 2.5–4.5)
PHOSPHATE SERPL-MCNC: 3.7 MG/DL — SIGNIFICANT CHANGE UP (ref 2.5–4.5)
PLATELET # BLD AUTO: 119 K/UL — LOW (ref 150–400)
PLATELET # BLD AUTO: 125 K/UL — LOW (ref 150–400)
PLATELET # BLD AUTO: 146 K/UL — LOW (ref 150–400)
POTASSIUM SERPL-MCNC: 4.2 MMOL/L — SIGNIFICANT CHANGE UP (ref 3.5–5.3)
POTASSIUM SERPL-MCNC: 4.3 MMOL/L — SIGNIFICANT CHANGE UP (ref 3.5–5.3)
POTASSIUM SERPL-MCNC: 4.3 MMOL/L — SIGNIFICANT CHANGE UP (ref 3.5–5.3)
POTASSIUM SERPL-SCNC: 4.2 MMOL/L — SIGNIFICANT CHANGE UP (ref 3.5–5.3)
POTASSIUM SERPL-SCNC: 4.3 MMOL/L — SIGNIFICANT CHANGE UP (ref 3.5–5.3)
POTASSIUM SERPL-SCNC: 4.3 MMOL/L — SIGNIFICANT CHANGE UP (ref 3.5–5.3)
PROTHROM AB SERPL-ACNC: 13.6 SEC — SIGNIFICANT CHANGE UP (ref 10.6–13.6)
RBC # BLD: 2.74 M/UL — LOW (ref 4.2–5.8)
RBC # BLD: 2.87 M/UL — LOW (ref 4.2–5.8)
RBC # BLD: 3.07 M/UL — LOW (ref 4.2–5.8)
RBC # FLD: 12.1 % — SIGNIFICANT CHANGE UP (ref 10.3–14.5)
RBC # FLD: 12.4 % — SIGNIFICANT CHANGE UP (ref 10.3–14.5)
RBC # FLD: 12.4 % — SIGNIFICANT CHANGE UP (ref 10.3–14.5)
SODIUM SERPL-SCNC: 133 MMOL/L — LOW (ref 135–145)
SODIUM SERPL-SCNC: 134 MMOL/L — LOW (ref 135–145)
SODIUM SERPL-SCNC: 136 MMOL/L — SIGNIFICANT CHANGE UP (ref 135–145)
WBC # BLD: 10.27 K/UL — SIGNIFICANT CHANGE UP (ref 3.8–10.5)
WBC # BLD: 11.22 K/UL — HIGH (ref 3.8–10.5)
WBC # BLD: 11.95 K/UL — HIGH (ref 3.8–10.5)
WBC # FLD AUTO: 10.27 K/UL — SIGNIFICANT CHANGE UP (ref 3.8–10.5)
WBC # FLD AUTO: 11.22 K/UL — HIGH (ref 3.8–10.5)
WBC # FLD AUTO: 11.95 K/UL — HIGH (ref 3.8–10.5)

## 2020-11-24 PROCEDURE — 99292 CRITICAL CARE ADDL 30 MIN: CPT

## 2020-11-24 PROCEDURE — 71045 X-RAY EXAM CHEST 1 VIEW: CPT | Mod: 26,76

## 2020-11-24 PROCEDURE — 99291 CRITICAL CARE FIRST HOUR: CPT

## 2020-11-24 RX ORDER — POTASSIUM CHLORIDE 20 MEQ
20 PACKET (EA) ORAL ONCE
Refills: 0 | Status: DISCONTINUED | OUTPATIENT
Start: 2020-11-24 | End: 2020-11-24

## 2020-11-24 RX ORDER — GLUCAGON INJECTION, SOLUTION 0.5 MG/.1ML
1 INJECTION, SOLUTION SUBCUTANEOUS ONCE
Refills: 0 | Status: DISCONTINUED | OUTPATIENT
Start: 2020-11-24 | End: 2020-11-24

## 2020-11-24 RX ORDER — DEXTROSE 50 % IN WATER 50 %
15 SYRINGE (ML) INTRAVENOUS ONCE
Refills: 0 | Status: DISCONTINUED | OUTPATIENT
Start: 2020-11-24 | End: 2020-11-24

## 2020-11-24 RX ORDER — DEXTROSE 50 % IN WATER 50 %
12.5 SYRINGE (ML) INTRAVENOUS ONCE
Refills: 0 | Status: DISCONTINUED | OUTPATIENT
Start: 2020-11-24 | End: 2020-11-25

## 2020-11-24 RX ORDER — PANTOPRAZOLE SODIUM 20 MG/1
40 TABLET, DELAYED RELEASE ORAL
Refills: 0 | Status: DISCONTINUED | OUTPATIENT
Start: 2020-11-24 | End: 2020-11-27

## 2020-11-24 RX ORDER — SODIUM CHLORIDE 9 MG/ML
1000 INJECTION, SOLUTION INTRAVENOUS
Refills: 0 | Status: DISCONTINUED | OUTPATIENT
Start: 2020-11-24 | End: 2020-11-24

## 2020-11-24 RX ORDER — METOCLOPRAMIDE HCL 10 MG
10 TABLET ORAL EVERY 8 HOURS
Refills: 0 | Status: COMPLETED | OUTPATIENT
Start: 2020-11-24 | End: 2020-11-25

## 2020-11-24 RX ORDER — METOPROLOL TARTRATE 50 MG
12.5 TABLET ORAL EVERY 12 HOURS
Refills: 0 | Status: DISCONTINUED | OUTPATIENT
Start: 2020-11-24 | End: 2020-11-24

## 2020-11-24 RX ORDER — FUROSEMIDE 40 MG
20 TABLET ORAL ONCE
Refills: 0 | Status: COMPLETED | OUTPATIENT
Start: 2020-11-24 | End: 2020-11-24

## 2020-11-24 RX ORDER — METOPROLOL TARTRATE 50 MG
25 TABLET ORAL EVERY 6 HOURS
Refills: 0 | Status: DISCONTINUED | OUTPATIENT
Start: 2020-11-24 | End: 2020-11-25

## 2020-11-24 RX ORDER — POTASSIUM CHLORIDE 20 MEQ
20 PACKET (EA) ORAL ONCE
Refills: 0 | Status: COMPLETED | OUTPATIENT
Start: 2020-11-24 | End: 2020-11-24

## 2020-11-24 RX ORDER — DEXTROSE 50 % IN WATER 50 %
25 SYRINGE (ML) INTRAVENOUS ONCE
Refills: 0 | Status: DISCONTINUED | OUTPATIENT
Start: 2020-11-24 | End: 2020-11-25

## 2020-11-24 RX ORDER — CALCIUM GLUCONATE 100 MG/ML
2 VIAL (ML) INTRAVENOUS ONCE
Refills: 0 | Status: COMPLETED | OUTPATIENT
Start: 2020-11-24 | End: 2020-11-24

## 2020-11-24 RX ORDER — INSULIN LISPRO 100/ML
VIAL (ML) SUBCUTANEOUS
Refills: 0 | Status: DISCONTINUED | OUTPATIENT
Start: 2020-11-24 | End: 2020-11-25

## 2020-11-24 RX ORDER — FUROSEMIDE 40 MG
40 TABLET ORAL ONCE
Refills: 0 | Status: COMPLETED | OUTPATIENT
Start: 2020-11-24 | End: 2020-11-24

## 2020-11-24 RX ORDER — ACETAMINOPHEN 500 MG
1000 TABLET ORAL ONCE
Refills: 0 | Status: COMPLETED | OUTPATIENT
Start: 2020-11-24 | End: 2020-11-24

## 2020-11-24 RX ORDER — SODIUM CHLORIDE 9 MG/ML
1000 INJECTION, SOLUTION INTRAVENOUS
Refills: 0 | Status: DISCONTINUED | OUTPATIENT
Start: 2020-11-24 | End: 2020-11-25

## 2020-11-24 RX ORDER — FUROSEMIDE 40 MG
80 TABLET ORAL ONCE
Refills: 0 | Status: COMPLETED | OUTPATIENT
Start: 2020-11-24 | End: 2020-11-24

## 2020-11-24 RX ORDER — METOPROLOL TARTRATE 50 MG
25 TABLET ORAL EVERY 8 HOURS
Refills: 0 | Status: DISCONTINUED | OUTPATIENT
Start: 2020-11-24 | End: 2020-11-24

## 2020-11-24 RX ADMIN — Medication 100 MILLIGRAM(S): at 21:41

## 2020-11-24 RX ADMIN — Medication 200 GRAM(S): at 11:59

## 2020-11-24 RX ADMIN — OXYCODONE AND ACETAMINOPHEN 2 TABLET(S): 5; 325 TABLET ORAL at 22:36

## 2020-11-24 RX ADMIN — Medication 20 MILLIGRAM(S): at 05:30

## 2020-11-24 RX ADMIN — Medication 81 MILLIGRAM(S): at 11:56

## 2020-11-24 RX ADMIN — Medication 50 MILLIGRAM(S): at 21:39

## 2020-11-24 RX ADMIN — Medication 200 GRAM(S): at 05:30

## 2020-11-24 RX ADMIN — Medication 100 MILLIGRAM(S): at 14:35

## 2020-11-24 RX ADMIN — OXYCODONE AND ACETAMINOPHEN 2 TABLET(S): 5; 325 TABLET ORAL at 23:30

## 2020-11-24 RX ADMIN — Medication 50 GRAM(S): at 00:38

## 2020-11-24 RX ADMIN — BUPROPION HYDROCHLORIDE 300 MILLIGRAM(S): 150 TABLET, EXTENDED RELEASE ORAL at 11:56

## 2020-11-24 RX ADMIN — Medication 100 MILLIEQUIVALENT(S): at 02:16

## 2020-11-24 RX ADMIN — ATORVASTATIN CALCIUM 80 MILLIGRAM(S): 80 TABLET, FILM COATED ORAL at 21:41

## 2020-11-24 RX ADMIN — Medication 400 MILLIGRAM(S): at 01:02

## 2020-11-24 RX ADMIN — OXYCODONE AND ACETAMINOPHEN 1 TABLET(S): 5; 325 TABLET ORAL at 14:35

## 2020-11-24 RX ADMIN — Medication 40 MILLIGRAM(S): at 09:30

## 2020-11-24 RX ADMIN — OXYCODONE AND ACETAMINOPHEN 1 TABLET(S): 5; 325 TABLET ORAL at 05:25

## 2020-11-24 RX ADMIN — CHLORHEXIDINE GLUCONATE 1 APPLICATION(S): 213 SOLUTION TOPICAL at 12:46

## 2020-11-24 RX ADMIN — Medication 25 MILLIGRAM(S): at 23:09

## 2020-11-24 RX ADMIN — Medication 100 MILLIEQUIVALENT(S): at 08:00

## 2020-11-24 RX ADMIN — Medication 80 MILLIGRAM(S): at 17:28

## 2020-11-24 RX ADMIN — OXYCODONE AND ACETAMINOPHEN 1 TABLET(S): 5; 325 TABLET ORAL at 12:58

## 2020-11-24 RX ADMIN — HEPARIN SODIUM 5000 UNIT(S): 5000 INJECTION INTRAVENOUS; SUBCUTANEOUS at 21:42

## 2020-11-24 RX ADMIN — OXYCODONE AND ACETAMINOPHEN 2 TABLET(S): 5; 325 TABLET ORAL at 01:31

## 2020-11-24 RX ADMIN — OXYCODONE AND ACETAMINOPHEN 2 TABLET(S): 5; 325 TABLET ORAL at 02:10

## 2020-11-24 RX ADMIN — OXYCODONE AND ACETAMINOPHEN 1 TABLET(S): 5; 325 TABLET ORAL at 06:00

## 2020-11-24 RX ADMIN — Medication 10 MILLIGRAM(S): at 21:41

## 2020-11-24 RX ADMIN — Medication 1000 MILLIGRAM(S): at 01:17

## 2020-11-24 RX ADMIN — SENNA PLUS 2 TABLET(S): 8.6 TABLET ORAL at 21:41

## 2020-11-24 RX ADMIN — OXYCODONE AND ACETAMINOPHEN 1 TABLET(S): 5; 325 TABLET ORAL at 11:56

## 2020-11-24 RX ADMIN — OXYCODONE AND ACETAMINOPHEN 1 TABLET(S): 5; 325 TABLET ORAL at 19:22

## 2020-11-24 RX ADMIN — HEPARIN SODIUM 5000 UNIT(S): 5000 INJECTION INTRAVENOUS; SUBCUTANEOUS at 05:30

## 2020-11-24 RX ADMIN — Medication 10 MILLIGRAM(S): at 14:36

## 2020-11-24 RX ADMIN — Medication 12.5 MILLIGRAM(S): at 05:30

## 2020-11-24 RX ADMIN — Medication 25 MILLIGRAM(S): at 15:48

## 2020-11-24 RX ADMIN — Medication 100 MILLIGRAM(S): at 06:39

## 2020-11-24 RX ADMIN — OXYCODONE AND ACETAMINOPHEN 2 TABLET(S): 5; 325 TABLET ORAL at 15:30

## 2020-11-24 RX ADMIN — HEPARIN SODIUM 5000 UNIT(S): 5000 INJECTION INTRAVENOUS; SUBCUTANEOUS at 14:35

## 2020-11-24 RX ADMIN — OXYCODONE AND ACETAMINOPHEN 1 TABLET(S): 5; 325 TABLET ORAL at 18:31

## 2020-11-24 NOTE — PHYSICAL THERAPY INITIAL EVALUATION ADULT - GAIT DEVIATIONS NOTED, PT EVAL
increasing chest pain to 4/10, deep diaphragmatic breathing exercises 2/2 sternal incisional pain, steady gait, no LOB, slight SOB, Sp02 > 92% on 6L/decreased weight-shifting ability/decreased susan/decreased step length

## 2020-11-24 NOTE — PROGRESS NOTE ADULT - SUBJECTIVE AND OBJECTIVE BOX
CTICU  CRITICAL  CARE  attending     Hand off received 					   Pertinent clinical, laboratory, radiographic, hemodynamic, echocardiographic, respiratory data, microbiologic data and chart were reviewed and analyzed frequently throughout the course of the day and night    70 year old male with HTN, HLD, former 7 pack year smoker (cigarettes and marijuana), (remote prior PATY history formerly on CPAP resolved with diet/exercise),    In May 2020, patient incidentally found to have CAD/NSTEMI after a hospital/ICU admission for babesiosis.   NST 6/2020 revealed small, fixed, mild to moderate basal inferolateral defect with normal wall motion.  The patient is followed by Dr. Partida, cardiology. The patient presented to his office 11/11/2020 after several weeks of left sided chest pain with exertion (never at rest).  He describes the pain as a dull ache that resolves with rest. Patient reports he is able to walk several flights of stairs prior without pain.   The patient went for an elective cardiac catheterization 11/19/2020 at [facility] that revealed 3 vessel disease with left main disease.   S/p CABG.      FAMILY HISTORY:  FH: hyperlipidemia    Family history of early CAD  mom s/p CABG at age 65    FH: Alzheimers disease    FH: type 2 diabetes    PAST MEDICAL & SURGICAL HISTORY:  Non-ST elevation MI (NSTEMI)  3-vessel CAD  HLD (hyperlipidemia)  HTN   S/P tonsillectomy  S/P laser trabeculoplasty of eye  S/P right inguinal hernia repair  S/p bilateral carpal tunnel release  S/P rotator cuff surgery        14 system review was unremarkable    Vital signs, hemodynamic and respiratory parameters were reviewed from the bedside nursing flow sheet.  ICU Vital Signs Last 24 Hrs  T(C): 37.2 (24 Nov 2020 17:39), Max: 37.2 (24 Nov 2020 17:39)  T(F): 98.9 (24 Nov 2020 17:39), Max: 98.9 (24 Nov 2020 17:39)  HR: 69 (24 Nov 2020 19:00) (69 - 86)  BP: 117/63 (24 Nov 2020 20:00) (117/63 - 166/77)  BP(mean): 85 (24 Nov 2020 20:00) (85 - 111)  ABP: 125/47 (24 Nov 2020 12:00) (112/32 - 155/57)  ABP(mean): 68 (24 Nov 2020 12:00) (56 - 88)  RR: 20 (24 Nov 2020 19:00) (14 - 20)  SpO2: 92% (24 Nov 2020 19:00) (90% - 98%)    Adult Advanced Hemodynamics Last 24 Hrs  CVP(mm Hg): 14 (24 Nov 2020 19:00) (-1 - 218)  CVP(cm H2O): --  CO: --  CI: --  PA: --  PA(mean): --  PCWP: --  SVR: --  SVRI: --  PVR: --  PVRI: --, ABG - ( 24 Nov 2020 11:30 )  pH, Arterial: 7.46  pH, Blood: x     /  pCO2: 34    /  pO2: 80    / HCO3: 23    / Base Excess: -0.2  /  SaO2: 96                  Intake and output was reviewed and the fluid balance was calculated  Daily Height in cm: 167.6 (24 Nov 2020 09:54)    Daily   I&O's Summary    23 Nov 2020 07:01  -  24 Nov 2020 07:00  --------------------------------------------------------  IN: 4236.3 mL / OUT: 3780 mL / NET: 456.3 mL    24 Nov 2020 07:01  -  24 Nov 2020 20:43  --------------------------------------------------------  IN: 710 mL / OUT: 935 mL / NET: -225 mL        All lines and drain sites were assessed      Neuro: Follows commands. Moves all 4 extremities.  Neck: No JVD.  CVS: S1, S2, No S3.  Lungs: Good air entry bilaterally.  Abd: Soft. No tenderness. + Bowel sounds.  Vascular: + DP/PT.  Extremities: No edema.  Lymphatic: Normal.  Skin: No abnormalities.      labs  CBC Full  -  ( 24 Nov 2020 19:44 )  WBC Count : 11.95 K/uL  RBC Count : 2.74 M/uL  Hemoglobin : 9.1 g/dL  Hematocrit : 26.8 %  Platelet Count - Automated : 125 K/uL  Mean Cell Volume : 97.8 fl  Mean Cell Hemoglobin : 33.2 pg  Mean Cell Hemoglobin Concentration : 34.0 gm/dL  Auto Neutrophil # : x  Auto Lymphocyte # : x  Auto Monocyte # : x  Auto Eosinophil # : x  Auto Basophil # : x  Auto Neutrophil % : x  Auto Lymphocyte % : x  Auto Monocyte % : x  Auto Eosinophil % : x  Auto Basophil % : x    11-24    133<L>  |  95<L>  |  17  ----------------------------<  123<H>  4.2   |  29  |  1.00    Ca    8.6      24 Nov 2020 19:44  Phos  3.6     11-24  Mg     2.0     11-24    TPro  5.2<L>  /  Alb  3.0<L>  /  TBili  0.7  /  DBili  x   /  AST  37  /  ALT  19  /  AlkPhos  42  11-23    PT/INR - ( 24 Nov 2020 03:03 )   PT: 13.6 sec;   INR: 1.14          PTT - ( 24 Nov 2020 03:03 )  PTT:25.7 sec  The current medications were reviewed   MEDICATIONS  (STANDING):  aspirin enteric coated 81 milliGRAM(s) Oral daily  atorvastatin 80 milliGRAM(s) Oral at bedtime  buPROPion XL . 300 milliGRAM(s) Oral daily  ceFAZolin   IVPB 2000 milliGRAM(s) IV Intermittent every 8 hours  chlorhexidine 2% Cloths 1 Application(s) Topical daily  dextrose 5%. 1000 milliLiter(s) (100 mL/Hr) IV Continuous <Continuous>  dextrose 50% Injectable 12.5 Gram(s) IV Push once  dextrose 50% Injectable 25 Gram(s) IV Push once  dextrose 50% Injectable 50 milliLiter(s) IV Push every 15 minutes  dextrose 50% Injectable 25 milliLiter(s) IV Push every 15 minutes  dextrose 50% Injectable 25 Gram(s) IV Push once  heparin   Injectable 5000 Unit(s) SubCutaneous every 8 hours  insulin lispro (ADMELOG) corrective regimen sliding scale   SubCutaneous Before meals and at bedtime  metoclopramide 10 milliGRAM(s) Oral every 8 hours  metoprolol tartrate 25 milliGRAM(s) Oral every 6 hours  pantoprazole    Tablet 40 milliGRAM(s) Oral before breakfast  senna 2 Tablet(s) Oral at bedtime  sodium chloride 0.9%. 1000 milliLiter(s) (10 mL/Hr) IV Continuous <Continuous>  traZODone 50 milliGRAM(s) Oral at bedtime    MEDICATIONS  (PRN):  acetaminophen   Tablet .. 650 milliGRAM(s) Oral every 4 hours PRN Mild Pain (1 - 3)  oxycodone    5 mG/acetaminophen 325 mG 1 Tablet(s) Oral every 4 hours PRN Moderate Pain (4 - 6)  oxycodone    5 mG/acetaminophen 325 mG 2 Tablet(s) Oral every 6 hours PRN Severe Pain (7 - 10)      70 years old male with CAD  S/P CABG.  Metabolic alkalosis.  Hemodynamically stable.  Good oxygenation.  Fair urine out put.  Overall doing well.      My plan includes :  Statin and Betablocker.  Dual antiplatelet Rx.  Close hemodynamic, ventilatory and drain monitoring and management  Monitor for arrhythmias and monitor parameters for organ perfusion  Monitor neurologic status  Monitor renal function.  Head of the bed should remain elevated to 45 deg .   Chest PT and IS will be encouraged  Monitor adequacy of oxygenation and ventilation and attempt to wean oxygen  Nutritional goals will be met using po eventually , ensure adequate caloric intake and monitor the same  Stress ulcer and VTE prophylaxis will be achieved    Glycemic control is satisfactory  Electrolytes have been repleted as necessary and wound care has been carried out. Pain control has been achieved.   Aggressive physical therapy and early mobility and ambulation goals will be met   The family was updated about the course and plan  CRITICAL CARE TIME SPENT in evaluation and management, reassessments, review and interpretation of labs and x-rays, ventilator and hemodynamic management, formulating a plan and coordinating care: ___30____ MIN.  Time does not include procedural time.  CTICU ATTENDING     					    Tobi Pike MD

## 2020-11-24 NOTE — PHYSICAL THERAPY INITIAL EVALUATION ADULT - GENERAL OBSERVATIONS, REHAB EVAL
Received sitting in chair complaints of incisional chest pain 2/10 +IV hep, TLC, O2 NC 6L, temp PPM. R SCD. Left as found + call bell

## 2020-11-24 NOTE — DIETITIAN INITIAL EVALUATION ADULT. - OTHER CALCULATIONS
Ideal body weight used for calculations as pt >120% of IBW. ABW 82.6kg, IBW 64kg, 129% IBW, ht 66", BMI 29.4. Nutrient needs based on [facility] standards of care for maintenance in adults, adjusted for post-op demand, fluid per team

## 2020-11-24 NOTE — CHART NOTE - NSCHARTNOTEFT_GEN_A_CORE
CT Removal:    Case discussed with Dr. Cunningham this AM. Patient seen and examined at bedside, minimal output from CTs with no air leak appreciated.  CT removed without incident per physician request.  Occlusive DSD placed.  Chest x-ray obtain with no obvious pneumothorax noted.  Patient tolerated procedure well.

## 2020-11-24 NOTE — PHYSICAL THERAPY INITIAL EVALUATION ADULT - PERTINENT HX OF CURRENT PROBLEM, REHAB EVAL
70M  is followed by Dr. Partida, cardiology, who he presented to 11/11/2020 after several weeks of left sided chest pain with exertion (never at rest), described as a dull ache that resolves with rest. Patient reports he is able to walk several flights of stairs prior without pain.   transferred today to [facility] for surgical evaluation by Dr. Cunningham, and presurgical workup for CABG.

## 2020-11-24 NOTE — DIETITIAN INITIAL EVALUATION ADULT. - ORAL INTAKE PTA/DIET HISTORY
regular diet, tries to "eat healthy" avoiding red meat, consumes salads/ vegetables and lean protein

## 2020-11-24 NOTE — PHYSICAL THERAPY INITIAL EVALUATION ADULT - ACTIVE RANGE OF MOTION EXAMINATION, REHAB EVAL
deficits as listed below/no Active ROM deficits were identified/BUE < 90 deg 2/2 sternal precautions

## 2020-11-24 NOTE — PROGRESS NOTE ADULT - SUBJECTIVE AND OBJECTIVE BOX
71 yo M, former 7 pack year smoker (cigarettes and marijuana), w PMHx of HTN, HLD, (remote prior PATY history formerly on CPAP resolved with diet/exercise), s/p cardiac catheterization in 2006 from referred left shoulder pain (findings 40% pLAD, 70% mLCx, 70% PDA determined unable to stent with adequate collateral). In May 2020, patient incidentally found to have CAD/NSTEMI after a hospital/ICU admission for babesiosis. NST 6/2020 revealed small, fixed, mild to moderate basal inferolateral defect with normal wall motion. Patient is followed by Dr. Partida, cardiology, who he presented to 11/11/2020 after several weeks of left sided chest pain with exertion (never at rest), described as a dull ache that resolves with rest. Patient reports he is able to walk several flights of stairs prior without pain. Patient went for an elective cardiac catheterization 11/19/2020 at [facility] the revealed 3 vessel disease with left main disease.      Patient transferred to [facility] for surgical evaluation by Dr. Cunningham, and presurgical workup for CABG.     POD # 1 CABG X4  Extubated, off drips      Vital Signs Last 24 Hrs  T(C): 36.9 (24 Nov 2020 09:10), Max: 37 (24 Nov 2020 01:01)  T(F): 98.4 (24 Nov 2020 09:10), Max: 98.6 (24 Nov 2020 01:01)  HR: 73 (24 Nov 2020 09:19) (53 - 86)  RR: 14 (24 Nov 2020 09:19) (11 - 18)  SpO2: 95% (24 Nov 2020 09:19) (90% - 100%)    alert, awake, verbal  follows commands  no JVD  S1S2 reg rate  diminished b/l air entry  soft abdomen, non distended, non tender  warm ext b/l, power 5/5 b/l    CXR - mild b/l congestion, gastric bubble  7.46/35/63  Lactate, Blood: 1.7 mmol/L (11-23-20 @ 21:28)    a/p:    CAD s/p CABG x4  Acute post op Hypoxic Resp failure  Acute post thoracotomy pain    PO ASA, Statin and beta blocker at low dose   Pain control PRN  O2 via NC to keep sat >94%. Can use HF NC if needed  diuresis to keep negative  f/u lytes  PO diet with glycemic control  OOB to chair  DVT and GI proph      45 minutes of critical care time spent providing medical care for patient's acute illness/conditions that impairs at least one vital organ system and/or poses a high risk of imminent or life threatening deterioration in the patient's condition. It includes time spent evaluating and treating the patient's acute illness as well as time spent reviewing labs, radiology, discussing goals of care with patient and/or patient's family, and discussing the case with a multidisciplinary team in an effort to prevent further life threatening deterioration or end organ damage. This time is independent of any procedures performed.

## 2020-11-25 ENCOUNTER — TRANSCRIPTION ENCOUNTER (OUTPATIENT)
Age: 70
End: 2020-11-25

## 2020-11-25 LAB
ANION GAP SERPL CALC-SCNC: 8 MMOL/L — SIGNIFICANT CHANGE UP (ref 5–17)
ANION GAP SERPL CALC-SCNC: 8 MMOL/L — SIGNIFICANT CHANGE UP (ref 5–17)
BUN SERPL-MCNC: 16 MG/DL — SIGNIFICANT CHANGE UP (ref 7–23)
BUN SERPL-MCNC: 20 MG/DL — SIGNIFICANT CHANGE UP (ref 7–23)
CALCIUM SERPL-MCNC: 8.3 MG/DL — LOW (ref 8.4–10.5)
CALCIUM SERPL-MCNC: 8.6 MG/DL — SIGNIFICANT CHANGE UP (ref 8.4–10.5)
CHLORIDE SERPL-SCNC: 96 MMOL/L — SIGNIFICANT CHANGE UP (ref 96–108)
CHLORIDE SERPL-SCNC: 98 MMOL/L — SIGNIFICANT CHANGE UP (ref 96–108)
CO2 SERPL-SCNC: 29 MMOL/L — SIGNIFICANT CHANGE UP (ref 22–31)
CO2 SERPL-SCNC: 29 MMOL/L — SIGNIFICANT CHANGE UP (ref 22–31)
CREAT SERPL-MCNC: 0.94 MG/DL — SIGNIFICANT CHANGE UP (ref 0.5–1.3)
CREAT SERPL-MCNC: 1 MG/DL — SIGNIFICANT CHANGE UP (ref 0.5–1.3)
GLUCOSE BLDC GLUCOMTR-MCNC: 106 MG/DL — HIGH (ref 70–99)
GLUCOSE SERPL-MCNC: 102 MG/DL — HIGH (ref 70–99)
GLUCOSE SERPL-MCNC: 130 MG/DL — HIGH (ref 70–99)
HCT VFR BLD CALC: 25.2 % — LOW (ref 39–50)
HGB BLD-MCNC: 8.6 G/DL — LOW (ref 13–17)
MAGNESIUM SERPL-MCNC: 2.1 MG/DL — SIGNIFICANT CHANGE UP (ref 1.6–2.6)
MCHC RBC-ENTMCNC: 33.5 PG — SIGNIFICANT CHANGE UP (ref 27–34)
MCHC RBC-ENTMCNC: 34.1 GM/DL — SIGNIFICANT CHANGE UP (ref 32–36)
MCV RBC AUTO: 98.1 FL — SIGNIFICANT CHANGE UP (ref 80–100)
NRBC # BLD: 0 /100 WBCS — SIGNIFICANT CHANGE UP (ref 0–0)
PHOSPHATE SERPL-MCNC: 3.1 MG/DL — SIGNIFICANT CHANGE UP (ref 2.5–4.5)
PLATELET # BLD AUTO: 110 K/UL — LOW (ref 150–400)
POTASSIUM SERPL-MCNC: 4 MMOL/L — SIGNIFICANT CHANGE UP (ref 3.5–5.3)
POTASSIUM SERPL-MCNC: 4.3 MMOL/L — SIGNIFICANT CHANGE UP (ref 3.5–5.3)
POTASSIUM SERPL-SCNC: 4 MMOL/L — SIGNIFICANT CHANGE UP (ref 3.5–5.3)
POTASSIUM SERPL-SCNC: 4.3 MMOL/L — SIGNIFICANT CHANGE UP (ref 3.5–5.3)
RBC # BLD: 2.57 M/UL — LOW (ref 4.2–5.8)
RBC # FLD: 12.4 % — SIGNIFICANT CHANGE UP (ref 10.3–14.5)
SODIUM SERPL-SCNC: 133 MMOL/L — LOW (ref 135–145)
SODIUM SERPL-SCNC: 135 MMOL/L — SIGNIFICANT CHANGE UP (ref 135–145)
WBC # BLD: 11 K/UL — HIGH (ref 3.8–10.5)
WBC # FLD AUTO: 11 K/UL — HIGH (ref 3.8–10.5)

## 2020-11-25 PROCEDURE — 99233 SBSQ HOSP IP/OBS HIGH 50: CPT

## 2020-11-25 PROCEDURE — 71045 X-RAY EXAM CHEST 1 VIEW: CPT | Mod: 26

## 2020-11-25 RX ORDER — SODIUM CHLORIDE 9 MG/ML
3 INJECTION INTRAMUSCULAR; INTRAVENOUS; SUBCUTANEOUS EVERY 8 HOURS
Refills: 0 | Status: DISCONTINUED | OUTPATIENT
Start: 2020-11-25 | End: 2020-11-27

## 2020-11-25 RX ORDER — FUROSEMIDE 40 MG
20 TABLET ORAL ONCE
Refills: 0 | Status: COMPLETED | OUTPATIENT
Start: 2020-11-25 | End: 2020-11-25

## 2020-11-25 RX ORDER — METOPROLOL TARTRATE 50 MG
25 TABLET ORAL EVERY 8 HOURS
Refills: 0 | Status: DISCONTINUED | OUTPATIENT
Start: 2020-11-25 | End: 2020-11-27

## 2020-11-25 RX ORDER — IPRATROPIUM/ALBUTEROL SULFATE 18-103MCG
3 AEROSOL WITH ADAPTER (GRAM) INHALATION ONCE
Refills: 0 | Status: COMPLETED | OUTPATIENT
Start: 2020-11-25 | End: 2020-11-25

## 2020-11-25 RX ADMIN — SODIUM CHLORIDE 3 MILLILITER(S): 9 INJECTION INTRAMUSCULAR; INTRAVENOUS; SUBCUTANEOUS at 22:12

## 2020-11-25 RX ADMIN — OXYCODONE AND ACETAMINOPHEN 2 TABLET(S): 5; 325 TABLET ORAL at 14:44

## 2020-11-25 RX ADMIN — ATORVASTATIN CALCIUM 80 MILLIGRAM(S): 80 TABLET, FILM COATED ORAL at 21:26

## 2020-11-25 RX ADMIN — OXYCODONE AND ACETAMINOPHEN 2 TABLET(S): 5; 325 TABLET ORAL at 13:44

## 2020-11-25 RX ADMIN — OXYCODONE AND ACETAMINOPHEN 2 TABLET(S): 5; 325 TABLET ORAL at 05:41

## 2020-11-25 RX ADMIN — Medication 25 MILLIGRAM(S): at 22:50

## 2020-11-25 RX ADMIN — Medication 25 MILLIGRAM(S): at 15:33

## 2020-11-25 RX ADMIN — OXYCODONE AND ACETAMINOPHEN 2 TABLET(S): 5; 325 TABLET ORAL at 20:13

## 2020-11-25 RX ADMIN — BUPROPION HYDROCHLORIDE 300 MILLIGRAM(S): 150 TABLET, EXTENDED RELEASE ORAL at 11:15

## 2020-11-25 RX ADMIN — Medication 25 MILLIGRAM(S): at 05:05

## 2020-11-25 RX ADMIN — SENNA PLUS 2 TABLET(S): 8.6 TABLET ORAL at 21:26

## 2020-11-25 RX ADMIN — HEPARIN SODIUM 5000 UNIT(S): 5000 INJECTION INTRAVENOUS; SUBCUTANEOUS at 05:05

## 2020-11-25 RX ADMIN — Medication 3 MILLILITER(S): at 21:31

## 2020-11-25 RX ADMIN — HEPARIN SODIUM 5000 UNIT(S): 5000 INJECTION INTRAVENOUS; SUBCUTANEOUS at 21:26

## 2020-11-25 RX ADMIN — OXYCODONE AND ACETAMINOPHEN 2 TABLET(S): 5; 325 TABLET ORAL at 19:13

## 2020-11-25 RX ADMIN — Medication 20 MILLIGRAM(S): at 07:53

## 2020-11-25 RX ADMIN — OXYCODONE AND ACETAMINOPHEN 2 TABLET(S): 5; 325 TABLET ORAL at 06:41

## 2020-11-25 RX ADMIN — Medication 81 MILLIGRAM(S): at 11:15

## 2020-11-25 RX ADMIN — PANTOPRAZOLE SODIUM 40 MILLIGRAM(S): 20 TABLET, DELAYED RELEASE ORAL at 06:07

## 2020-11-25 RX ADMIN — Medication 10 MILLIGRAM(S): at 05:05

## 2020-11-25 RX ADMIN — HEPARIN SODIUM 5000 UNIT(S): 5000 INJECTION INTRAVENOUS; SUBCUTANEOUS at 13:44

## 2020-11-25 RX ADMIN — OXYCODONE AND ACETAMINOPHEN 1 TABLET(S): 5; 325 TABLET ORAL at 02:00

## 2020-11-25 RX ADMIN — SODIUM CHLORIDE 3 MILLILITER(S): 9 INJECTION INTRAMUSCULAR; INTRAVENOUS; SUBCUTANEOUS at 15:29

## 2020-11-25 RX ADMIN — OXYCODONE AND ACETAMINOPHEN 1 TABLET(S): 5; 325 TABLET ORAL at 01:30

## 2020-11-25 NOTE — DISCHARGE NOTE NURSING/CASE MANAGEMENT/SOCIAL WORK - NSDCFUADDAPPT_GEN_ALL_CORE_FT
-Please follow up with Dr. Cunningham and Dr. Partida. The office of Dr. Cunningham will call you with your appointments. The office is located at [facility], 4th floor. Call us with any questions #[phone].       -Walk daily as tolerated and use your incentive spirometer every hour.    -No driving or strenuous activity/exercise until cleared by your surgeon.    -Gently clean your incisions with anti-bacterial soap and water, pat  dry.  You may leave them open to air.    -Call your doctor if you have shortness of breath, chest pain not  relieved by pain medication, dizziness, fever >101.5, or increased  redness or drainage from incisions.

## 2020-11-25 NOTE — PROGRESS NOTE ADULT - SUBJECTIVE AND OBJECTIVE BOX
Patient discussed on morning rounds with Dr. Cunningham    Operation / Date: 11/23/2020 CABG X 4 (LIMA-LAD, SVG-Ramus, OM, PDA), EF normal    SUBJECTIVE ASSESSMENT:  70y Male       Vital Signs Last 24 Hrs  T(C): 37.2 (25 Nov 2020 05:01), Max: 37.2 (24 Nov 2020 17:39)  T(F): 98.9 (25 Nov 2020 05:01), Max: 98.9 (24 Nov 2020 17:39)  HR: 73 (25 Nov 2020 12:15) (66 - 92)  BP: 106/55 (25 Nov 2020 12:15) (86/52 - 166/77)  BP(mean): 74 (25 Nov 2020 12:15) (63 - 111)  RR: 17 (25 Nov 2020 12:15) (15 - 21)  SpO2: 95% (25 Nov 2020 12:15) (90% - 97%)  I&O's Detail    24 Nov 2020 07:01  -  25 Nov 2020 07:00  --------------------------------------------------------  IN:    IV PiggyBack: 150 mL    Oral Fluid: 560 mL    sodium chloride 0.9%: 220 mL  Total IN: 930 mL    OUT:    Chest Tube (mL): 30 mL    Indwelling Catheter - Urethral (mL): 355 mL    Voided (mL): 1475 mL  Total OUT: 1860 mL    Total NET: -930 mL      25 Nov 2020 07:01  -  25 Nov 2020 13:23  --------------------------------------------------------  IN:    Oral Fluid: 315 mL  Total IN: 315 mL    OUT:    Voided (mL): 300 mL  Total OUT: 300 mL    Total NET: 15 mL      CHEST TUBE:  Yes/No. AIR LEAKS: Yes/No. Suction / H2O SEAL.   KAN DRAIN:  Yes/No.  EPICARDIAL WIRES: Yes/No.  TIE DOWNS: Yes/No.  GAR: Yes/No.    PHYSICAL EXAM:    General:     Neurological:    Cardiovascular:    Respiratory:    Gastrointestinal:    Extremities:    Vascular:    Incision Sites:    LABS:                        8.6    11.00 )-----------( 110      ( 25 Nov 2020 02:37 )             25.2       COUMADIN:  No    PT/INR - ( 24 Nov 2020 03:03 )   PT: 13.6 sec;   INR: 1.14          PTT - ( 24 Nov 2020 03:03 )  PTT:25.7 sec    11-25    133<L>  |  96  |  16  ----------------------------<  130<H>  4.0   |  29  |  0.94    Ca    8.6      25 Nov 2020 02:37  Phos  3.1     11-25  Mg     2.1     11-25    TPro  5.2<L>  /  Alb  3.0<L>  /  TBili  0.7  /  DBili  x   /  AST  37  /  ALT  19  /  AlkPhos  42  11-23      MEDICATIONS  (STANDING):  aspirin enteric coated 81 milliGRAM(s) Oral daily  atorvastatin 80 milliGRAM(s) Oral at bedtime  buPROPion XL . 300 milliGRAM(s) Oral daily  heparin   Injectable 5000 Unit(s) SubCutaneous every 8 hours  metoprolol tartrate 25 milliGRAM(s) Oral every 6 hours  pantoprazole    Tablet 40 milliGRAM(s) Oral before breakfast  senna 2 Tablet(s) Oral at bedtime  sodium chloride 0.9% lock flush 3 milliLiter(s) IV Push every 8 hours  traZODone 50 milliGRAM(s) Oral at bedtime    MEDICATIONS  (PRN):  acetaminophen   Tablet .. 650 milliGRAM(s) Oral every 4 hours PRN Mild Pain (1 - 3)  oxycodone    5 mG/acetaminophen 325 mG 1 Tablet(s) Oral every 4 hours PRN Moderate Pain (4 - 6)  oxycodone    5 mG/acetaminophen 325 mG 2 Tablet(s) Oral every 6 hours PRN Severe Pain (7 - 10)      RADIOLOGY & ADDITIONAL TESTS:  < from: Xray Chest 1 View- PORTABLE-Routine (Xray Chest 1 View- PORTABLE-Routine in AM.) (11.25.20 @ 03:59) >  EXAM:  XR CHEST PORTABLE ROUTINE 1V                          PROCEDURE DATE:  11/25/2020          INTERPRETATION:  Clinical history/reason for exam: Postop.    Frontal chest.    COMPARISON: November 24, 2020 time 12:17 PM.    Findings/  impression:Stable positioning of support device. Stable heart size, status post median sternotomy. Left focal atelectasis, increased. Stable bony structures.    < end of copied text >    A/P:    70 year old male, former 7 pack year smoker (cigarettes and marijuana), with a PMHx of HTN, HLD, remote prior PATY history (formerly on CPAP resolved with diet/exercise), who in May 2020 was incidentally found to have CAD/NSTEMI after a hospital/ICU admission for babesiosis. NST 6/2020 revealed small, fixed, mild to moderate basal inferolateral defect with normal wall motion. Patient is followed by Dr. Partida, cardiology, who he presented to 11/11/2020 after several weeks of left sided chest pain with exertion. Cardiac catheterization on 11/19/2020 at [facility] revealed 3VCAD. He was then transferred to [facility] under the care of Dr. Cunningham for further management. He was deemed a good surgical candidate and on 11/23/20 underwent an uncomplicated CABGX4 (LIMA-LAD, SVG-Ramus, OM, PDA), EF nl. Post operatively he was brought to the CTICU and was extubated later on POD0. POD1 chest tubes and lines were removed and he was diuresed. Today is POD2, patient deemed stable for transfer to [facility] telemetry.     Neurovascular:   - No delirium. Pain well controlled with current regimen.  -Continue tylenol, percocet PRN  - Continue home buproprion and trazodone     Cardiovascular:   - POD2 s/p CABGX4, EF normal  -Hemodynamically stable. HR controlled (69-92)  - Hx of HTN, BP controlled (114//77), continue metoprolol 25 mg q6 hours  - CAD s/p CABG: continue ASA, atorvastatin 80 mg     Respiratory:   - 02 Sat = 97% on 4L NC  -If on oxygen wean to RA from for O2 Sat > 93%.  -Encourage C+DB and Use of IS 10x / hr while awake.  -CXR with no effusions, f/u AM PA/Lateral    GI:   - Stable.  -Continue GI PPX with protonix  -PO Diet.    Renal / :  - BUN/Cr stable at 16/0.94  -Continue to monitor renal function.  -Monitor I/O's, continue diuresis PRN  - Replete electrolytes PRN    Endocrine:    -A1c 5.3, no known hx diabetes  -TSH 2.739, no known hx thyroid disease     Hematologic:  -H/H stable at 8.6/25.2 with no obvious signs of bleeding  -Coagulation Panel.    ID:  -Pt remains afebrile with no elevation in WBC or signs of infection  -Continue to monitor CBC  -Observe for SIRS/Sepsis Syndrome.    Prophylaxis:  -DVT prophylaxis with 5000 SubQ Heparin q8h.  -SCD's    Disposition:  -Home when medically appropriate, likely later this week       Patient discussed on morning rounds with Dr. Cunningham    Operation / Date: 11/23/2020 CABG X 4 (LIMA-LAD, SVG-Ramus, OM, PDA), EF normal    SUBJECTIVE ASSESSMENT:  70y Male assessed at bedside after ambulation from the CTICU. Patient feels better today, only complaint is that he is tired because he hasn't slept well. He denies SOB, states he walked without difficulty. Has some incisional pain but otherwise denies chest pain. No fever, chills, nausea, vomiting. No BM yet but is passing flatus. Is using IS (1000 cc).     Vital Signs Last 24 Hrs  T(C): 37.2 (25 Nov 2020 05:01), Max: 37.2 (24 Nov 2020 17:39)  T(F): 98.9 (25 Nov 2020 05:01), Max: 98.9 (24 Nov 2020 17:39)  HR: 73 (25 Nov 2020 12:15) (66 - 92)  BP: 106/55 (25 Nov 2020 12:15) (86/52 - 166/77)  BP(mean): 74 (25 Nov 2020 12:15) (63 - 111)  RR: 17 (25 Nov 2020 12:15) (15 - 21)  SpO2: 95% (25 Nov 2020 12:15) (90% - 97%)  I&O's Detail    24 Nov 2020 07:01  -  25 Nov 2020 07:00  --------------------------------------------------------  IN:    IV PiggyBack: 150 mL    Oral Fluid: 560 mL    sodium chloride 0.9%: 220 mL  Total IN: 930 mL    OUT:    Chest Tube (mL): 30 mL    Indwelling Catheter - Urethral (mL): 355 mL    Voided (mL): 1475 mL  Total OUT: 1860 mL    Total NET: -930 mL      25 Nov 2020 07:01  -  25 Nov 2020 13:23  --------------------------------------------------------  IN:    Oral Fluid: 315 mL  Total IN: 315 mL    OUT:    Voided (mL): 300 mL  Total OUT: 300 mL    Total NET: 15 mL      CHEST TUBE: No  KAN DRAIN:  No.  EPICARDIAL WIRES: Yes  TIE DOWNS: Yes  GAR: No.    Physical Exam  CONSTITUTIONAL: Well appearing in NAD, assessed sitting at bedside   NEURO: A&OX3. No focal deficits noted, moving bilateral upper and lower extremities                    CV: RRR, no murmurs, rubs, gallops  RESPIRATORY: Clear to auscultation bilateral posterior lung fields, no wheezes, rales, rhonchi   GI: +BS, NT/ND  MUSKULOSKELETAL: No peripheral edema or calf tenderness. Full strength and ROM bilateral upper and lower extremities   VASCULAR: Bilateral distal pulses 2+  INCISIONS: MSI under clean mepelex dressing. LLE SVG harvest site clean, dry, intact.       LABS:                        8.6    11.00 )-----------( 110      ( 25 Nov 2020 02:37 )             25.2       COUMADIN:  No    PT/INR - ( 24 Nov 2020 03:03 )   PT: 13.6 sec;   INR: 1.14          PTT - ( 24 Nov 2020 03:03 )  PTT:25.7 sec    11-25    133<L>  |  96  |  16  ----------------------------<  130<H>  4.0   |  29  |  0.94    Ca    8.6      25 Nov 2020 02:37  Phos  3.1     11-25  Mg     2.1     11-25    TPro  5.2<L>  /  Alb  3.0<L>  /  TBili  0.7  /  DBili  x   /  AST  37  /  ALT  19  /  AlkPhos  42  11-23      MEDICATIONS  (STANDING):  aspirin enteric coated 81 milliGRAM(s) Oral daily  atorvastatin 80 milliGRAM(s) Oral at bedtime  buPROPion XL . 300 milliGRAM(s) Oral daily  heparin   Injectable 5000 Unit(s) SubCutaneous every 8 hours  metoprolol tartrate 25 milliGRAM(s) Oral every 6 hours  pantoprazole    Tablet 40 milliGRAM(s) Oral before breakfast  senna 2 Tablet(s) Oral at bedtime  sodium chloride 0.9% lock flush 3 milliLiter(s) IV Push every 8 hours  traZODone 50 milliGRAM(s) Oral at bedtime    MEDICATIONS  (PRN):  acetaminophen   Tablet .. 650 milliGRAM(s) Oral every 4 hours PRN Mild Pain (1 - 3)  oxycodone    5 mG/acetaminophen 325 mG 1 Tablet(s) Oral every 4 hours PRN Moderate Pain (4 - 6)  oxycodone    5 mG/acetaminophen 325 mG 2 Tablet(s) Oral every 6 hours PRN Severe Pain (7 - 10)      RADIOLOGY & ADDITIONAL TESTS:  < from: Xray Chest 1 View- PORTABLE-Routine (Xray Chest 1 View- PORTABLE-Routine in AM.) (11.25.20 @ 03:59) >  EXAM:  XR CHEST PORTABLE ROUTINE 1V                          PROCEDURE DATE:  11/25/2020          INTERPRETATION:  Clinical history/reason for exam: Postop.    Frontal chest.    COMPARISON: November 24, 2020 time 12:17 PM.    Findings/  impression:Stable positioning of support device. Stable heart size, status post median sternotomy. Left focal atelectasis, increased. Stable bony structures.    < end of copied text >    A/P:    70 year old male, former 7 pack year smoker (cigarettes and marijuana), with a PMHx of HTN, HLD, remote prior PATY history (formerly on CPAP resolved with diet/exercise), who in May 2020 was incidentally found to have CAD/NSTEMI after a hospital/ICU admission for babesiosis. NST 6/2020 revealed small, fixed, mild to moderate basal inferolateral defect with normal wall motion. Patient is followed by Dr. Partida, cardiology, who he presented to 11/11/2020 after several weeks of left sided chest pain with exertion. Cardiac catheterization on 11/19/2020 at [facility] revealed 3VCAD. He was then transferred to [facility] under the care of Dr. Cunningham for further management. He was deemed a good surgical candidate and on 11/23/20 underwent an uncomplicated CABGX4 (LIMA-LAD, SVG-Ramus, OM, PDA), EF nl. Post operatively he was brought to the CTICU and was extubated later on POD0. POD1 chest tubes and lines were removed and he was diuresed. Today is POD2, patient deemed stable for transfer to [facility] telemetry.     Neurovascular:   - No delirium. Pain well controlled with current regimen.  -Continue tylenol, percocet PRN  - Continue home buproprion and trazodone     Cardiovascular:   - POD2 s/p CABGX4, EF normal  -Hemodynamically stable. HR controlled (69-92)  - Hx of HTN, BP controlled (114//77), continue metoprolol 25 mg q6 hours  - CAD s/p CABG: continue ASA, atorvastatin 80 mg     Respiratory:   - 02 Sat = 97% on 4L NC  -If on oxygen wean to RA from for O2 Sat > 93%.  -Encourage C+DB and Use of IS 10x / hr while awake.  -CXR with no effusions, f/u AM PA/Lateral    GI:   - Stable.  -Continue GI PPX with protonix  -PO Diet.    Renal / :  - BUN/Cr stable at 16/0.94  -Continue to monitor renal function.  -Monitor I/O's, continue diuresis PRN  - Replete electrolytes PRN    Endocrine:    -A1c 5.3, no known hx diabetes  -TSH 2.739, no known hx thyroid disease     Hematologic:  -H/H stable at 8.6/25.2 with no obvious signs of bleeding  -Coagulation Panel.    ID:  -Pt remains afebrile with no elevation in WBC or signs of infection  -Continue to monitor CBC  -Observe for SIRS/Sepsis Syndrome.    Prophylaxis:  -DVT prophylaxis with 5000 SubQ Heparin q8h.  -SCD's    Disposition:  -Home when medically appropriate, likely later this week

## 2020-11-25 NOTE — DISCHARGE NOTE PROVIDER - CARE PROVIDER_API CALL
Wesley Cunningham)  Thoracic and Cardiac Surgery  [address] [place] [address]  Phone: [phone]  Fax: [phone]  Established Patient  Follow Up Time:     Tej Partida  [address] [place] [address]  Phone: [phone]  Fax: (   )    -  Follow Up Time:

## 2020-11-25 NOTE — DISCHARGE NOTE PROVIDER - HOSPITAL COURSE
70 year old male with a PMHx of HTN, HLD, s/p cardiac catheterization in 2006 from referred left shoulder pain (findings 40% pLAD, 70% mLCx, 70% PDA). In May 2020, found to have CAD/NSTEMI after a hospital/ICU admission for babesiosis. He presented 11/11/2020 after several weeks of left sided chest pain with exertion (never at rest), described as a dull ache that resolves with rest. Patient went for an elective cardiac catheterization 11/19/2020 at [facility] that revealed 3 vessel left main disease. Dr. Cunningham was consulted and patient deemed an appropriate surgical candidate. Preoperative workup performed during hospitalization. Patient went for an uncomplicated CABGx4, normal EF on 11/23/2020 with Dr. Cunningham. Patient was extubated POD0 in the CTICU. 70 year old male with a PMHx of HTN, HLD, s/p cardiac catheterization in 2006 from referred left shoulder pain (findings 40% pLAD, 70% mLCx, 70% PDA). In May 2020, found to have CAD/NSTEMI after a hospital/ICU admission for babesiosis. He presented 11/11/2020 after several weeks of left sided chest pain with exertion (never at rest), described as a dull ache that resolves with rest. Patient went for an [facility] cardiac [facility] revealed 3 vessel left main disease. Dr. Cunningham was consulted and patient deemed an appropriate surgical candidate. Preoperative workup performed during hospitalization. Patient went for an uncomplicated CABGx4, normal EF on 11/23/2020 with Dr. Cunningham. Patient was extubated POD0 in the CTICU. POD1 chest tube and lines was removed without difficulty and patient was diuresed. POD2 patient transferred to floor from CTICU. POD3 patient weaned off NC to RA without difficulty. POD3 patient moving his bowels, tolerating PO diet and ambulating without supplemental oxygen. POD4 pacing wires removed, patient remained in bed and SBP stable. Patient medically stable for discharge.         Over 35 minutes was spent with the patient reviewing the discharge material including medications, follow up appointments, recovery, concerning symptoms, and how to contact their health care providers if they have questions

## 2020-11-25 NOTE — DISCHARGE NOTE PROVIDER - NSDCMRMEDTOKEN_GEN_ALL_CORE_FT
amLODIPine 5 mg oral tablet: 1 tab(s) orally once a day  Aspirin Enteric Coated 81 mg oral delayed release tablet: 1 tab(s) orally once a day  buPROPion 300 mg/24 hours (XL) oral tablet, extended release: 1 tab(s) orally every 24 hours  Crestor 40 mg oral tablet: 1 tab(s) orally once a day  Metoprolol Tartrate 50 mg oral tablet: 1 tab(s) orally once a day, in the AM  Revatio 20 mg oral tablet: 1 tab(s) orally once a day, As Needed  traZODone 50 mg oral tablet: 1 tab(s) orally once a day (at bedtime)   acetaminophen 325 mg oral tablet: 2 tab(s) orally every 4 hours, As needed, Mild Pain (1 - 3)  Aspirin Enteric Coated 81 mg oral delayed release tablet: 1 tab(s) orally once a day  buPROPion 300 mg/24 hours (XL) oral tablet, extended release: 1 tab(s) orally every 24 hours  buPROPion 300 mg/24 hours (XL) oral tablet, extended release: 1 tab(s) orally once a day  Crestor 40 mg oral tablet: 1 tab(s) orally once a day  Metoprolol Tartrate 50 mg oral tablet: 1 tab(s) orally once a day, in the AM  metoprolol tartrate 50 mg oral tablet: 1 tab(s) orally every 12 hours   senna oral tablet: 2 tab(s) orally once a day (at bedtime)  traZODone 50 mg oral tablet: 1 tab(s) orally once a day (at bedtime)   acetaminophen 325 mg oral tablet: 2 tab(s) orally every 4 hours, As needed, Mild Pain (1 - 3)  Aspirin Enteric Coated 81 mg oral delayed release tablet: 1 tab(s) orally once a day  buPROPion 300 mg/24 hours (XL) oral tablet, extended release: 1 tab(s) orally once a day  Crestor 40 mg oral tablet: 1 tab(s) orally once a day  metoprolol tartrate 50 mg oral tablet: 1 tab(s) orally every 12 hours   oxycodone-acetaminophen 5 mg-325 mg oral tablet: 1 to 2 tab(s) orally every 6 hours, As needed, Severe Pain (7 - 10) MDD:8  senna oral tablet: 2 tab(s) orally once a day (at bedtime)  traZODone 50 mg oral tablet: 1 tab(s) orally once a day (at bedtime) MDD:1

## 2020-11-25 NOTE — PROGRESS NOTE ADULT - SUBJECTIVE AND OBJECTIVE BOX
69 yo M, former 7 pack year smoker (cigarettes and marijuana), w PMHx of HTN, HLD, (remote prior PATY history formerly on CPAP resolved with diet/exercise), s/p cardiac catheterization in 2006 from referred left shoulder pain (findings 40% pLAD, 70% mLCx, 70% PDA determined unable to stent with adequate collateral). In May 2020, patient incidentally found to have CAD/NSTEMI after a hospital/ICU admission for babesiosis. NST 6/2020 revealed small, fixed, mild to moderate basal inferolateral defect with normal wall motion. Patient is followed by Dr. Partida, cardiology, who he presented to 11/11/2020 after several weeks of left sided chest pain with exertion (never at rest), described as a dull ache that resolves with rest. Patient reports he is able to walk several flights of stairs prior without pain. Patient went for an elective cardiac catheterization 11/19/2020 at [facility] the revealed 3 vessel disease with left main disease.      Patient transferred to [facility] for surgical evaluation by Dr. Cunningham, and presurgical workup for CABG.     POD # 2 CABG X4    Vital Signs Last 24 Hrs  T(C): 37.2 (25 Nov 2020 05:01), Max: 37.2 (24 Nov 2020 17:39)  T(F): 98.9 (25 Nov 2020 05:01), Max: 98.9 (24 Nov 2020 17:39)  HR: 75 (25 Nov 2020 07:00) (69 - 92)  BP: 114/55 (25 Nov 2020 07:00) (114/55 - 166/77)  BP(mean): 76 (25 Nov 2020 07:00) (76 - 111)  RR: 16 (25 Nov 2020 07:00) (14 - 20)  SpO2: 90% (25 Nov 2020 07:00) (90% - 98%)    alert, awake, verbal  follows commands  clean sternal incision  S1S2 reg rate  diminished b/l air entry  soft abdomen, non distended, non tender  warm ext b/l, power 5/5 b/l    CXR - mild b/l congestion, small left effusion  BUN 16, Cr 0.94  WBC 11    a/p:    CAD s/p CABG x4  Acute post op Hypoxic Resp failure  Acute post thoracotomy pain    PO ASA, Statin and beta blocker 25 q6   Pain control PRN  O2 via NC to keep sat >94%. Can use HF NC if needed  diuresis to keep negative  f/u lytes  PO diet with glycemic control  OOB to chair  DVT and GI proph

## 2020-11-25 NOTE — DISCHARGE NOTE PROVIDER - PROVIDER TOKENS
PROVIDER:[TOKEN:[[id number]],ESTABLISHEDPATIENT:[T]],FREE:[LAST:[Jaquan],FIRST:[Tej],PHONE:[phone]],FAX:[(   [phone]    -],ADDRESS:[[address] [place] [address]]]

## 2020-11-25 NOTE — DISCHARGE NOTE PROVIDER - NSDCFUADDAPPT_GEN_ALL_CORE_FT
-Please follow up with Dr. Cunningham on ___.  The office is located at [facility], [facility], 4th floor. Call us with any questions  #[phone].    -Walk daily as tolerated and use your incentive spirometer every hour.    -No driving or strenuous activity/exercise until cleared by your surgeon.    -Gently clean your incisions with anti-bacterial soap and water, pat  dry.  You may leave them open to air.    -Call your doctor if you have shortness of breath, chest pain not  relieved by pain medication, dizziness, fever >101.5, or increased  redness or drainage from incisions.   -Please follow up with Dr. Cunningham and Dr. Partida. The office of Dr. Cunningham will call you with your appointments. The office is located at [facility], [facility], 4th floor. Call us with any questions #[phone].       -Walk daily as tolerated and use your incentive spirometer every hour.    -No driving or strenuous activity/exercise until cleared by your surgeon.    -Gently clean your incisions with anti-bacterial soap and water, pat  dry.  You may leave them open to air.    -Call your doctor if you have shortness of breath, chest pain not  relieved by pain medication, dizziness, fever >101.5, or increased  redness or drainage from incisions.

## 2020-11-26 LAB
ANION GAP SERPL CALC-SCNC: 8 MMOL/L — SIGNIFICANT CHANGE UP (ref 5–17)
BUN SERPL-MCNC: 18 MG/DL — SIGNIFICANT CHANGE UP (ref 7–23)
CALCIUM SERPL-MCNC: 8.3 MG/DL — LOW (ref 8.4–10.5)
CHLORIDE SERPL-SCNC: 97 MMOL/L — SIGNIFICANT CHANGE UP (ref 96–108)
CO2 SERPL-SCNC: 28 MMOL/L — SIGNIFICANT CHANGE UP (ref 22–31)
CREAT SERPL-MCNC: 0.86 MG/DL — SIGNIFICANT CHANGE UP (ref 0.5–1.3)
GLUCOSE SERPL-MCNC: 107 MG/DL — HIGH (ref 70–99)
HCT VFR BLD CALC: 26.6 % — LOW (ref 39–50)
HGB BLD-MCNC: 8.8 G/DL — LOW (ref 13–17)
MAGNESIUM SERPL-MCNC: 2.1 MG/DL — SIGNIFICANT CHANGE UP (ref 1.6–2.6)
MCHC RBC-ENTMCNC: 33.1 GM/DL — SIGNIFICANT CHANGE UP (ref 32–36)
MCHC RBC-ENTMCNC: 33.3 PG — SIGNIFICANT CHANGE UP (ref 27–34)
MCV RBC AUTO: 100.8 FL — HIGH (ref 80–100)
NRBC # BLD: 0 /100 WBCS — SIGNIFICANT CHANGE UP (ref 0–0)
PLATELET # BLD AUTO: 114 K/UL — LOW (ref 150–400)
POTASSIUM SERPL-MCNC: 4.1 MMOL/L — SIGNIFICANT CHANGE UP (ref 3.5–5.3)
POTASSIUM SERPL-SCNC: 4.1 MMOL/L — SIGNIFICANT CHANGE UP (ref 3.5–5.3)
RBC # BLD: 2.64 M/UL — LOW (ref 4.2–5.8)
RBC # FLD: 12.2 % — SIGNIFICANT CHANGE UP (ref 10.3–14.5)
SODIUM SERPL-SCNC: 133 MMOL/L — LOW (ref 135–145)
WBC # BLD: 9.96 K/UL — SIGNIFICANT CHANGE UP (ref 3.8–10.5)
WBC # FLD AUTO: 9.96 K/UL — SIGNIFICANT CHANGE UP (ref 3.8–10.5)

## 2020-11-26 PROCEDURE — 71046 X-RAY EXAM CHEST 2 VIEWS: CPT | Mod: 26

## 2020-11-26 RX ORDER — FUROSEMIDE 40 MG
20 TABLET ORAL ONCE
Refills: 0 | Status: COMPLETED | OUTPATIENT
Start: 2020-11-26 | End: 2020-11-26

## 2020-11-26 RX ADMIN — ATORVASTATIN CALCIUM 80 MILLIGRAM(S): 80 TABLET, FILM COATED ORAL at 22:53

## 2020-11-26 RX ADMIN — OXYCODONE AND ACETAMINOPHEN 1 TABLET(S): 5; 325 TABLET ORAL at 13:06

## 2020-11-26 RX ADMIN — BUPROPION HYDROCHLORIDE 300 MILLIGRAM(S): 150 TABLET, EXTENDED RELEASE ORAL at 12:07

## 2020-11-26 RX ADMIN — HEPARIN SODIUM 5000 UNIT(S): 5000 INJECTION INTRAVENOUS; SUBCUTANEOUS at 22:53

## 2020-11-26 RX ADMIN — OXYCODONE AND ACETAMINOPHEN 1 TABLET(S): 5; 325 TABLET ORAL at 19:57

## 2020-11-26 RX ADMIN — SODIUM CHLORIDE 3 MILLILITER(S): 9 INJECTION INTRAMUSCULAR; INTRAVENOUS; SUBCUTANEOUS at 05:55

## 2020-11-26 RX ADMIN — Medication 50 MILLIGRAM(S): at 22:53

## 2020-11-26 RX ADMIN — SODIUM CHLORIDE 3 MILLILITER(S): 9 INJECTION INTRAMUSCULAR; INTRAVENOUS; SUBCUTANEOUS at 21:18

## 2020-11-26 RX ADMIN — Medication 25 MILLIGRAM(S): at 15:16

## 2020-11-26 RX ADMIN — PANTOPRAZOLE SODIUM 40 MILLIGRAM(S): 20 TABLET, DELAYED RELEASE ORAL at 06:49

## 2020-11-26 RX ADMIN — Medication 25 MILLIGRAM(S): at 06:49

## 2020-11-26 RX ADMIN — HEPARIN SODIUM 5000 UNIT(S): 5000 INJECTION INTRAVENOUS; SUBCUTANEOUS at 06:49

## 2020-11-26 RX ADMIN — OXYCODONE AND ACETAMINOPHEN 1 TABLET(S): 5; 325 TABLET ORAL at 12:06

## 2020-11-26 RX ADMIN — OXYCODONE AND ACETAMINOPHEN 1 TABLET(S): 5; 325 TABLET ORAL at 01:57

## 2020-11-26 RX ADMIN — OXYCODONE AND ACETAMINOPHEN 1 TABLET(S): 5; 325 TABLET ORAL at 01:30

## 2020-11-26 RX ADMIN — Medication 50 MILLIGRAM(S): at 00:14

## 2020-11-26 RX ADMIN — Medication 20 MILLIGRAM(S): at 12:47

## 2020-11-26 RX ADMIN — HEPARIN SODIUM 5000 UNIT(S): 5000 INJECTION INTRAVENOUS; SUBCUTANEOUS at 15:16

## 2020-11-26 RX ADMIN — OXYCODONE AND ACETAMINOPHEN 1 TABLET(S): 5; 325 TABLET ORAL at 18:57

## 2020-11-26 RX ADMIN — SODIUM CHLORIDE 3 MILLILITER(S): 9 INJECTION INTRAMUSCULAR; INTRAVENOUS; SUBCUTANEOUS at 17:03

## 2020-11-26 RX ADMIN — Medication 25 MILLIGRAM(S): at 22:53

## 2020-11-26 RX ADMIN — Medication 81 MILLIGRAM(S): at 12:06

## 2020-11-26 NOTE — PROGRESS NOTE ADULT - SUBJECTIVE AND OBJECTIVE BOX
Patient discussed on morning rounds with Dr. Rodriguez    Operation / Date: 11/23 CABG. EF nl    SUBJECTIVE ASSESSMENT:  70y Male seen and examined sitting up in chair. No complaints.        Vital Signs Last 24 Hrs  T(C): 36.4 (26 Nov 2020 12:59), Max: 36.8 (25 Nov 2020 21:43)  T(F): 97.6 (26 Nov 2020 12:59), Max: 98.3 (25 Nov 2020 21:43)  HR: 70 (26 Nov 2020 17:00) (70 - 78)  BP: 114/80 (26 Nov 2020 17:00) (114/80 - 158/65)  BP(mean): 93 (26 Nov 2020 17:00) (84 - 105)  RR: 18 (26 Nov 2020 17:00) (17 - 20)  SpO2: 93% (26 Nov 2020 17:00) (92% - 95%)  I&O's Detail    25 Nov 2020 07:01  -  26 Nov 2020 07:00  --------------------------------------------------------  IN:    Oral Fluid: 839 mL  Total IN: 839 mL    OUT:    Voided (mL): 1450 mL  Total OUT: 1450 mL    Total NET: -611 mL      26 Nov 2020 07:01  -  26 Nov 2020 18:39  --------------------------------------------------------  IN:    Oral Fluid: 354 mL  Total IN: 354 mL    OUT:    Voided (mL): 250 mL  Total OUT: 250 mL    Total NET: 104 mL          CHEST TUBE: No  KAN DRAIN:  No.  EPICARDIAL WIRES: Yes  TIE DOWNS: Yes  GAR: No.    PHYSICAL EXAM:  Appearance: No acute distress.  Neurologic: AAOx3, no AMS or focal deficits.  Responds appropriately to verbal and physical stimuli; exhibits purposeful movement in all extremities.   Cardiovascular: RRR, S1 S2. No m/r/g.  Respiratory: CTAB, shallow breaths  Gastrointestinal:  Soft, non-tender, non-distended, + BS.	  Extremities: warm, well perfused. trace edema.  Incision Sites: MSI CDI, no sternal clicking    LABS:                        8.8    9.96  )-----------( 114      ( 26 Nov 2020 05:52 )             26.6           11-26    133<L>  |  97  |  18  ----------------------------<  107<H>  4.1   |  28  |  0.86    Ca    8.3<L>      26 Nov 2020 05:52  Phos  3.1     11-25  Mg     2.1     11-26            MEDICATIONS  (STANDING):  aspirin enteric coated 81 milliGRAM(s) Oral daily  atorvastatin 80 milliGRAM(s) Oral at bedtime  buPROPion XL . 300 milliGRAM(s) Oral daily  heparin   Injectable 5000 Unit(s) SubCutaneous every 8 hours  metoprolol tartrate 25 milliGRAM(s) Oral every 8 hours  pantoprazole    Tablet 40 milliGRAM(s) Oral before breakfast  senna 2 Tablet(s) Oral at bedtime  sodium chloride 0.9% lock flush 3 milliLiter(s) IV Push every 8 hours  traZODone 50 milliGRAM(s) Oral at bedtime    MEDICATIONS  (PRN):  acetaminophen   Tablet .. 650 milliGRAM(s) Oral every 4 hours PRN Mild Pain (1 - 3)  bisacodyl Suppository 10 milliGRAM(s) Rectal daily PRN Constipation  oxycodone    5 mG/acetaminophen 325 mG 1 Tablet(s) Oral every 4 hours PRN Moderate Pain (4 - 6)  oxycodone    5 mG/acetaminophen 325 mG 2 Tablet(s) Oral every 6 hours PRN Severe Pain (7 - 10)        RADIOLOGY & ADDITIONAL TESTS:

## 2020-11-26 NOTE — PROGRESS NOTE ADULT - ASSESSMENT
70 year old male, former 7 pack year smoker (cigarettes and marijuana), with a PMHx of HTN, HLD, remote prior PATY history (formerly on CPAP resolved with diet/exercise), who in May 2020 was incidentally found to have CAD/NSTEMI after a hospital/ICU admission for babesiosis. NST 6/2020 revealed small, fixed, mild to moderate basal inferolateral defect with normal wall motion. Patient is followed by Dr. Partida, cardiology, who he presented to 11/11/2020 after several weeks of left sided chest pain with exertion. Cardiac catheterization on 11/19/2020 at [facility] revealed 3VCAD. He was then transferred to [facility] under the care of Dr. Cunningham for further management. He was deemed a good surgical candidate and on 11/23/20 underwent an uncomplicated CABGX4 (LIMA-LAD, SVG-Ramus, OM, PDA), EF nl. Post operatively he was brought to the CTICU and was extubated later on POD0. POD1 chest tubes and lines were removed and he was diuresed. POD2, patient deemed stable for transfer to [facility] telemetry. POD3, weaned off NC to RA. He had BM. He has been ambulating. Likely home tomorrow.    Neurovascular:   - No delirium. Pain well controlled with current regimen.  - Continue Tylenol, percocet PRN  - Continue home buproprion and trazodone     Cardiovascular:   - POD3 s/p CABGX4, EF normal  - Hemodynamically stable. HR controlled.  - Hx of HTN continue metoprolol 25 mg q8 hours  - CAD s/p CABG: continue ASA, atorvastatin 80 mg     Respiratory:   - 02 Sat = 97% on RA  -If on oxygen wean to RA from for O2 Sat > 93%.  -Encourage C+DB and Use of IS 10x / hr while awake.  - PA/Lat with L atlectasis    GI:   - Stable.  -Continue GI PPX with protonix  -PO Diet.    Renal / :  - BUN/Cr stable   - Continue to monitor renal function.  - Monitor I/O's, continue diuresis PRN  - Replete electrolytes PRN    Endocrine:    -A1c 5.3, no known hx diabetes  -TSH 2.739, no known hx thyroid disease     Hematologic:  -H/H stable   -Coagulation Panel.    ID:  -Pt remains afebrile with no elevation in WBC or signs of infection  -Continue to monitor CBC  -Observe for SIRS/Sepsis Syndrome.    Prophylaxis:  -DVT prophylaxis with 5000 SubQ Heparin q8h.  -SCD's    Disposition:  -Home when medically appropriate, likely later this week

## 2020-11-27 VITALS
RESPIRATION RATE: 18 BRPM | SYSTOLIC BLOOD PRESSURE: 124 MMHG | HEART RATE: 666 BPM | DIASTOLIC BLOOD PRESSURE: 67 MMHG | OXYGEN SATURATION: 97 %

## 2020-11-27 PROCEDURE — 93005 ELECTROCARDIOGRAM TRACING: CPT

## 2020-11-27 PROCEDURE — 84100 ASSAY OF PHOSPHORUS: CPT

## 2020-11-27 PROCEDURE — 84132 ASSAY OF SERUM POTASSIUM: CPT

## 2020-11-27 PROCEDURE — 97116 GAIT TRAINING THERAPY: CPT

## 2020-11-27 PROCEDURE — 84443 ASSAY THYROID STIM HORMONE: CPT

## 2020-11-27 PROCEDURE — 80053 COMPREHEN METABOLIC PANEL: CPT

## 2020-11-27 PROCEDURE — 86901 BLOOD TYPING SEROLOGIC RH(D): CPT

## 2020-11-27 PROCEDURE — 93880 EXTRACRANIAL BILAT STUDY: CPT

## 2020-11-27 PROCEDURE — 85730 THROMBOPLASTIN TIME PARTIAL: CPT

## 2020-11-27 PROCEDURE — 85610 PROTHROMBIN TIME: CPT

## 2020-11-27 PROCEDURE — 82550 ASSAY OF CK (CPK): CPT

## 2020-11-27 PROCEDURE — 83605 ASSAY OF LACTIC ACID: CPT

## 2020-11-27 PROCEDURE — 86922 COMPATIBILITY TEST ANTIGLOB: CPT

## 2020-11-27 PROCEDURE — 71045 X-RAY EXAM CHEST 1 VIEW: CPT

## 2020-11-27 PROCEDURE — 86870 RBC ANTIBODY IDENTIFICATION: CPT

## 2020-11-27 PROCEDURE — 97530 THERAPEUTIC ACTIVITIES: CPT

## 2020-11-27 PROCEDURE — 84295 ASSAY OF SERUM SODIUM: CPT

## 2020-11-27 PROCEDURE — 81003 URINALYSIS AUTO W/O SCOPE: CPT

## 2020-11-27 PROCEDURE — 83036 HEMOGLOBIN GLYCOSYLATED A1C: CPT

## 2020-11-27 PROCEDURE — P9045: CPT

## 2020-11-27 PROCEDURE — 86803 HEPATITIS C AB TEST: CPT

## 2020-11-27 PROCEDURE — 97163 PT EVAL HIGH COMPLEX 45 MIN: CPT

## 2020-11-27 PROCEDURE — C1889: CPT

## 2020-11-27 PROCEDURE — 86900 BLOOD TYPING SEROLOGIC ABO: CPT

## 2020-11-27 PROCEDURE — 86880 COOMBS TEST DIRECT: CPT

## 2020-11-27 PROCEDURE — 82553 CREATINE MB FRACTION: CPT

## 2020-11-27 PROCEDURE — 85025 COMPLETE CBC W/AUTO DIFF WBC: CPT

## 2020-11-27 PROCEDURE — 80048 BASIC METABOLIC PNL TOTAL CA: CPT

## 2020-11-27 PROCEDURE — 84484 ASSAY OF TROPONIN QUANT: CPT

## 2020-11-27 PROCEDURE — 94150 VITAL CAPACITY TEST: CPT

## 2020-11-27 PROCEDURE — 36415 COLL VENOUS BLD VENIPUNCTURE: CPT

## 2020-11-27 PROCEDURE — 82962 GLUCOSE BLOOD TEST: CPT

## 2020-11-27 PROCEDURE — 83880 ASSAY OF NATRIURETIC PEPTIDE: CPT

## 2020-11-27 PROCEDURE — 80061 LIPID PANEL: CPT

## 2020-11-27 PROCEDURE — 82803 BLOOD GASES ANY COMBINATION: CPT

## 2020-11-27 PROCEDURE — 85027 COMPLETE CBC AUTOMATED: CPT

## 2020-11-27 PROCEDURE — 71046 X-RAY EXAM CHEST 2 VIEWS: CPT

## 2020-11-27 PROCEDURE — 93306 TTE W/DOPPLER COMPLETE: CPT

## 2020-11-27 PROCEDURE — 82330 ASSAY OF CALCIUM: CPT

## 2020-11-27 PROCEDURE — 94640 AIRWAY INHALATION TREATMENT: CPT

## 2020-11-27 PROCEDURE — 83735 ASSAY OF MAGNESIUM: CPT

## 2020-11-27 PROCEDURE — 86850 RBC ANTIBODY SCREEN: CPT

## 2020-11-27 RX ORDER — ASPIRIN/CALCIUM CARB/MAGNESIUM 324 MG
1 TABLET ORAL
Qty: 0 | Refills: 0 | DISCHARGE

## 2020-11-27 RX ORDER — ROSUVASTATIN CALCIUM 5 MG/1
1 TABLET ORAL
Qty: 0 | Refills: 0 | DISCHARGE

## 2020-11-27 RX ORDER — SENNA PLUS 8.6 MG/1
2 TABLET ORAL
Qty: 14 | Refills: 0
Start: 2020-11-27 | End: 2020-12-03

## 2020-11-27 RX ORDER — TRAZODONE HCL 50 MG
1 TABLET ORAL
Qty: 7 | Refills: 0
Start: 2020-11-27 | End: 2020-12-03

## 2020-11-27 RX ORDER — METOPROLOL TARTRATE 50 MG
1 TABLET ORAL
Qty: 60 | Refills: 0
Start: 2020-11-27 | End: 2020-12-26

## 2020-11-27 RX ORDER — ASPIRIN/CALCIUM CARB/MAGNESIUM 324 MG
1 TABLET ORAL
Qty: 30 | Refills: 0
Start: 2020-11-27 | End: 2020-12-26

## 2020-11-27 RX ORDER — POTASSIUM CHLORIDE 20 MEQ
20 PACKET (EA) ORAL ONCE
Refills: 0 | Status: DISCONTINUED | OUTPATIENT
Start: 2020-11-27 | End: 2020-11-27

## 2020-11-27 RX ORDER — AMLODIPINE BESYLATE 2.5 MG/1
1 TABLET ORAL
Qty: 0 | Refills: 0 | DISCHARGE

## 2020-11-27 RX ORDER — ACETAMINOPHEN 500 MG
2 TABLET ORAL
Qty: 360 | Refills: 0
Start: 2020-11-27 | End: 2020-12-26

## 2020-11-27 RX ORDER — TRAZODONE HCL 50 MG
1 TABLET ORAL
Qty: 0 | Refills: 0 | DISCHARGE

## 2020-11-27 RX ORDER — ROSUVASTATIN CALCIUM 5 MG/1
1 TABLET ORAL
Qty: 30 | Refills: 0
Start: 2020-11-27 | End: 2020-12-26

## 2020-11-27 RX ORDER — METOPROLOL TARTRATE 50 MG
1 TABLET ORAL
Qty: 0 | Refills: 0 | DISCHARGE

## 2020-11-27 RX ORDER — BUPROPION HYDROCHLORIDE 150 MG/1
1 TABLET, EXTENDED RELEASE ORAL
Qty: 30 | Refills: 0
Start: 2020-11-27 | End: 2020-12-26

## 2020-11-27 RX ORDER — BUPROPION HYDROCHLORIDE 150 MG/1
1 TABLET, EXTENDED RELEASE ORAL
Qty: 0 | Refills: 0 | DISCHARGE

## 2020-11-27 RX ADMIN — PANTOPRAZOLE SODIUM 40 MILLIGRAM(S): 20 TABLET, DELAYED RELEASE ORAL at 06:10

## 2020-11-27 RX ADMIN — BUPROPION HYDROCHLORIDE 300 MILLIGRAM(S): 150 TABLET, EXTENDED RELEASE ORAL at 11:50

## 2020-11-27 RX ADMIN — HEPARIN SODIUM 5000 UNIT(S): 5000 INJECTION INTRAVENOUS; SUBCUTANEOUS at 06:11

## 2020-11-27 RX ADMIN — Medication 650 MILLIGRAM(S): at 06:10

## 2020-11-27 RX ADMIN — Medication 25 MILLIGRAM(S): at 06:10

## 2020-11-27 RX ADMIN — Medication 650 MILLIGRAM(S): at 07:10

## 2020-11-27 RX ADMIN — Medication 81 MILLIGRAM(S): at 11:50

## 2020-11-27 RX ADMIN — SODIUM CHLORIDE 3 MILLILITER(S): 9 INJECTION INTRAMUSCULAR; INTRAVENOUS; SUBCUTANEOUS at 06:05

## 2020-11-27 NOTE — PROGRESS NOTE ADULT - REASON FOR ADMISSION
workup for CABG

## 2020-11-27 NOTE — CHART NOTE - NSCHARTNOTEFT_GEN_A_CORE
Patient in the bed, dressing removed and pacing wires disconnected from the pacing box.     Atrial wires removed without difficulty. Cleaned area, disconnected atrial wires, cut suture, removed grounding wire, then removed atrial wire without difficulty.     Patient tolerated wire pull.    Ventricular wires attempted to be removed, met with resistance, cut and retraced into skin. Patient tolerated without difficulty.    Patient laid in bed for two hours and SBP remained stable.

## 2020-11-27 NOTE — PROGRESS NOTE ADULT - ASSESSMENT
70 year old male with a PMHx of HTN, HLD, s/p cardiac catheterization in 2006 from referred left shoulder pain (findings 40% pLAD, 70% mLCx, 70% PDA). In May 2020, found to have CAD/NSTEMI after a hospital/ICU admission for babesiosis. He presented 11/11/2020 after several weeks of left sided chest pain with exertion (never at rest), described as a dull ache that resolves with rest. Patient went for an elective cardiac catheterization 11/19/2020 at [facility] that revealed 3 vessel left main disease. Dr. Cunningham was consulted and patient deemed an appropriate surgical candidate. Preoperative workup performed during hospitalization. Patient went for an uncomplicated CABGx4, normal EF on 11/23/2020 with Dr. Cunningham. Patient was extubated POD0 in the CTICU. POD1 chest tube and lines was removed without difficulty and patient was diuresed. POD2 patient transferred to floor from CTICU. POD3 patient weaned off NC to RA without difficulty. POD3 patient moving his bowels, tolerating PO diet and ambulating without supplemental oxygen. POD4 pacing wires removed, patient remained in bed and SBP stable. Patient medically stable for discharge.    70 year old male with a PMHx of HTN, HLD, s/p cardiac catheterization in 2006 from referred left shoulder pain (findings 40% pLAD, 70% mLCx, 70% PDA). In May 2020, found to have CAD/NSTEMI after a hospital/ICU admission for babesiosis. He presented 11/11/2020 after several weeks of left sided chest pain with exertion (never at rest), described as a dull ache that resolves with rest. Patient went for an elective cardiac catheterization 11/19/2020 at [facility] that revealed 3 vessel left main disease. Dr. Cunningham was consulted and patient deemed an appropriate surgical candidate. Preoperative workup performed during hospitalization. Patient went for an uncomplicated CABGx4, normal EF on 11/23/2020 with Dr. Cunningham. Patient was extubated POD0 in the CTICU. POD1 chest tube and lines was removed without difficulty and patient was diuresed. POD2 patient transferred to floor from CTICU. POD3 patient weaned off NC to RA without difficulty. POD3 patient moving his bowels, tolerating PO diet and ambulating without supplemental oxygen. POD4 pacing wires removed, patient remained in bed and SBP stable. Patient medically stable for discharge.      Follow Up:  Care Providers for Follow up (PCP/Outpatient Provider)	Wesley Cunningham)  Thoracic and Cardiac Surgery  [address] [place] [address]  Phone: [phone]  Fax: [phone]  Established Patient  Follow Up Time:     Tej Partida  [address] [place] [address]  Phone: [phone]  Fax: (   )    -  Follow Up Time:  Additional Scheduled Appointments	-Please follow up with Dr. Cunningham and Dr. Partida. The office of Dr. Cunningham will call you with your appointments. The office is located at [facility], [address] [facility]. Call us with any questions #[phone].       -Walk daily as tolerated and use your incentive spirometer every hour.    -No driving or strenuous activity/exercise until cleared by your surgeon.    -Gently clean your incisions with anti-bacterial soap and water, pat  dry.  You may leave them open to air.    -Call your doctor if you have shortness of breath, chest pain not  relieved by pain medication, dizziness, fever >101.5, or increased  redness or drainage from incisions.

## 2020-11-27 NOTE — PROGRESS NOTE ADULT - SUBJECTIVE AND OBJECTIVE BOX
Patient discussed on morning rounds with Dr. Cunningham      Operation / Date: 11/23/2020; CABGx4, EF nml    Surgeon: Dr. Cunningham    Referring Physician: Dr. Partida    SUBJECTIVE ASSESSMENT AND HOSPITAL COURSE:  70 year old male with a PMHx of HTN, HLD, s/p cardiac catheterization in 2006 from referred left shoulder pain (findings 40% pLAD, 70% mLCx, 70% PDA). In May 2020, found to have CAD/NSTEMI after a hospital/ICU admission for babesiosis. He presented 11/11/2020 after several weeks of left sided chest pain with exertion (never at rest), described as a dull ache that resolves with rest. Patient went for an elective cardiac catheterization 11/19/2020 at [facility] that revealed 3 vessel left main disease. Dr. Cunningham was consulted and patient deemed an appropriate surgical candidate. Preoperative workup performed during hospitalization. Patient went for an uncomplicated CABGx4, normal EF on 11/23/2020 with Dr. Cunningham. Patient was extubated POD0 in the CTICU. POD1 chest tube and lines was removed without difficulty and patient was diuresed. POD2 patient transferred to floor from CTICU. POD3 patient weaned off NC to RA without difficulty. POD3 patient moving his bowels, tolerating PO diet and ambulating without supplemental oxygen. POD4 pacing wires removed, patient remained in bed and SBP stable. Patient medically stable for discharge.     Vital Signs Last 24 Hrs  T(C): 36.1 (27 Nov 2020 09:09), Max: 37.2 (26 Nov 2020 22:13)  T(F): 97 (27 Nov 2020 09:09), Max: 98.9 (26 Nov 2020 22:13)  HR: 666 (27 Nov 2020 11:04) (66 - 666)  BP: 124/67 (27 Nov 2020 11:04) (111/635 - 161/74)  BP(mean): 88 (27 Nov 2020 11:04) (83 - 837)  RR: 18 (27 Nov 2020 11:04) (17 - 18)  SpO2: 97% (27 Nov 2020 11:04) (92% - 98%)    EPICARDIAL WIRES REMOVED: Yes.  TIE DOWNS REMOVED: Yes.    PHYSICAL EXAM:    General: NAD, sitting in chair.    Neurological: AOx3, no neuro focal deficits, motor skills intact    Cardiovascular: regular rate and rhythm, no murmurs, rubs, gallops appreciated    Respiratory:  CTABL, no rales, rhonchi, wheezing    Gastrointestinal: +BS, NT, ND    Extremities: WWP, trace LLE edema    Incision Sites: midsternal incision c/d/i, no sternal clicks; left lower extremity graft incisions c/d/i    LABS:                        8.8    9.96  )-----------( 114      ( 26 Nov 2020 05:52 )             26.6       COUMADIN:  No.      11-26    133<L>  |  97  |  18  ----------------------------<  107<H>  4.1   |  28  |  0.86    Ca    8.3<L>      26 Nov 2020 05:52  Mg     2.1     11-26            Discharge CXR:    Discharge ECHO:

## 2020-11-28 ENCOUNTER — TRANSCRIPTION ENCOUNTER (OUTPATIENT)
Age: 70
End: 2020-11-28

## 2020-11-30 ENCOUNTER — NON-APPOINTMENT (OUTPATIENT)
Age: 70
End: 2020-11-30

## 2020-11-30 RX ORDER — ROSUVASTATIN CALCIUM 40 MG/1
40 TABLET, FILM COATED ORAL DAILY
Refills: 0 | Status: ACTIVE | COMMUNITY
Start: 2020-11-30

## 2020-11-30 RX ORDER — ASPIRIN 81 MG/1
81 TABLET ORAL DAILY
Refills: 0 | Status: ACTIVE | COMMUNITY
Start: 2020-11-30

## 2020-11-30 RX ORDER — BUPROPION HYDROCHLORIDE 300 MG/1
300 TABLET, EXTENDED RELEASE ORAL DAILY
Refills: 0 | Status: ACTIVE | COMMUNITY
Start: 2020-11-30

## 2020-11-30 RX ORDER — TRAZODONE HYDROCHLORIDE 50 MG/1
50 TABLET ORAL
Refills: 0 | Status: ACTIVE | COMMUNITY
Start: 2020-11-30

## 2020-11-30 RX ORDER — ACETAMINOPHEN 325 MG/1
325 TABLET ORAL EVERY 6 HOURS
Refills: 0 | Status: ACTIVE | COMMUNITY
Start: 2020-11-30

## 2020-12-01 PROBLEM — I25.10 ATHEROSCLEROTIC HEART DISEASE OF NATIVE CORONARY ARTERY WITHOUT ANGINA PECTORIS: Chronic | Status: ACTIVE | Noted: 2020-11-19

## 2020-12-01 PROBLEM — I21.4 NON-ST ELEVATION (NSTEMI) MYOCARDIAL INFARCTION: Chronic | Status: ACTIVE | Noted: 2020-11-19

## 2020-12-01 PROBLEM — E78.5 HYPERLIPIDEMIA, UNSPECIFIED: Chronic | Status: ACTIVE | Noted: 2020-11-19

## 2020-12-01 PROBLEM — I10 ESSENTIAL (PRIMARY) HYPERTENSION: Chronic | Status: ACTIVE | Noted: 2020-11-19

## 2020-12-03 ENCOUNTER — APPOINTMENT (OUTPATIENT)
Dept: CARE COORDINATION | Facility: HOME HEALTH | Age: 70
End: 2020-12-03

## 2020-12-03 DIAGNOSIS — I25.2 OLD MYOCARDIAL INFARCTION: ICD-10-CM

## 2020-12-03 DIAGNOSIS — Z86.79 PERSONAL HISTORY OF OTHER DISEASES OF THE CIRCULATORY SYSTEM: ICD-10-CM

## 2020-12-03 DIAGNOSIS — Z86.39 PERSONAL HISTORY OF OTHER ENDOCRINE, NUTRITIONAL AND METABOLIC DISEASE: ICD-10-CM

## 2020-12-03 DIAGNOSIS — Z86.19 PERSONAL HISTORY OF OTHER INFECTIOUS AND PARASITIC DISEASES: ICD-10-CM

## 2020-12-04 DIAGNOSIS — G89.12 ACUTE POST-THORACOTOMY PAIN: ICD-10-CM
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
## 2020-12-04 DIAGNOSIS — Z83.438 FAMILY HISTORY OF OTHER DISORDER OF LIPOPROTEIN METABOLISM AND OTHER LIPIDEMIA: ICD-10-CM

## 2020-12-04 DIAGNOSIS — K59.00 CONSTIPATION, UNSPECIFIED: ICD-10-CM

## 2020-12-04 DIAGNOSIS — I25.110 ATHEROSCLEROTIC HEART DISEASE OF NATIVE CORONARY ARTERY WITH UNSTABLE ANGINA PECTORIS: ICD-10-CM

## 2020-12-04 DIAGNOSIS — Z87.891 PERSONAL HISTORY OF NICOTINE DEPENDENCE: ICD-10-CM
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
## 2020-12-04 DIAGNOSIS — E78.5 HYPERLIPIDEMIA, UNSPECIFIED: ICD-10-CM

## 2020-12-04 DIAGNOSIS — Z83.3 FAMILY HISTORY OF DIABETES MELLITUS: ICD-10-CM

## 2020-12-04 DIAGNOSIS — Z79.82 LONG TERM (CURRENT) USE OF ASPIRIN: ICD-10-CM

## 2020-12-04 DIAGNOSIS — J98.11 ATELECTASIS: ICD-10-CM

## 2020-12-04 DIAGNOSIS — E87.3 ALKALOSIS: ICD-10-CM

## 2020-12-04 DIAGNOSIS — I25.2 OLD MYOCARDIAL INFARCTION: ICD-10-CM

## 2020-12-04 DIAGNOSIS — F32.9 MAJOR DEPRESSIVE DISORDER, SINGLE EPISODE, UNSPECIFIED: ICD-10-CM

## 2020-12-04 DIAGNOSIS — Z82.49 FAMILY HISTORY OF ISCHEMIC HEART DISEASE AND OTHER DISEASES OF THE CIRCULATORY SYSTEM: ICD-10-CM

## 2020-12-04 DIAGNOSIS — I10 ESSENTIAL (PRIMARY) HYPERTENSION: ICD-10-CM

## 2020-12-07 ENCOUNTER — APPOINTMENT (OUTPATIENT)
Dept: CARDIOTHORACIC SURGERY | Facility: CLINIC | Age: 70
End: 2020-12-07
Payer: MEDICARE

## 2020-12-07 ENCOUNTER — OUTPATIENT (OUTPATIENT)
Dept: OUTPATIENT SERVICES | Facility: HOSPITAL | Age: 70
LOS: 1 days | End: 2020-12-07
Payer: MEDICARE

## 2020-12-07 VITALS
HEART RATE: 62 BPM | HEIGHT: 66 IN | RESPIRATION RATE: 17 BRPM | OXYGEN SATURATION: 97 % | BODY MASS INDEX: 28.61 KG/M2 | SYSTOLIC BLOOD PRESSURE: 178 MMHG | TEMPERATURE: 96.8 F | WEIGHT: 178 LBS | DIASTOLIC BLOOD PRESSURE: 81 MMHG

## 2020-12-07 DIAGNOSIS — Z98.890 OTHER SPECIFIED POSTPROCEDURAL STATES: Chronic | ICD-10-CM

## 2020-12-07 DIAGNOSIS — Z90.89 ACQUIRED ABSENCE OF OTHER ORGANS: Chronic | ICD-10-CM

## 2020-12-07 PROCEDURE — 71046 X-RAY EXAM CHEST 2 VIEWS: CPT | Mod: 26

## 2020-12-07 PROCEDURE — 71046 X-RAY EXAM CHEST 2 VIEWS: CPT

## 2020-12-07 PROCEDURE — 99024 POSTOP FOLLOW-UP VISIT: CPT

## 2020-12-29 ENCOUNTER — TRANSCRIPTION ENCOUNTER (OUTPATIENT)
Age: 70
End: 2020-12-29

## 2021-01-11 ENCOUNTER — APPOINTMENT (OUTPATIENT)
Dept: CARDIOTHORACIC SURGERY | Facility: CLINIC | Age: 71
End: 2021-01-11
Payer: MEDICARE

## 2021-01-11 VITALS
HEART RATE: 58 BPM | BODY MASS INDEX: 28.77 KG/M2 | WEIGHT: 179 LBS | OXYGEN SATURATION: 99 % | TEMPERATURE: 96.8 F | HEIGHT: 66 IN | SYSTOLIC BLOOD PRESSURE: 180 MMHG | DIASTOLIC BLOOD PRESSURE: 98 MMHG

## 2021-01-11 DIAGNOSIS — Z95.1 PRESENCE OF AORTOCORONARY BYPASS GRAFT: ICD-10-CM

## 2021-01-11 DIAGNOSIS — Z09 ENCOUNTER FOR FOLLOW-UP EXAMINATION AFTER COMPLETED TREATMENT FOR CONDITIONS OTHER THAN MALIGNANT NEOPLASM: ICD-10-CM

## 2021-01-11 PROCEDURE — 99024 POSTOP FOLLOW-UP VISIT: CPT

## 2021-01-11 RX ORDER — OXYCODONE AND ACETAMINOPHEN 5; 325 MG/1; MG/1
5-325 TABLET ORAL
Refills: 0 | Status: COMPLETED | COMMUNITY
Start: 2020-11-30 | End: 2021-01-11

## 2021-01-11 RX ORDER — SENNOSIDES 8.6 MG/1
8.6 CAPSULE, GELATIN COATED ORAL
Refills: 0 | Status: DISCONTINUED | COMMUNITY
Start: 2020-11-30 | End: 2021-01-11

## 2021-01-11 RX ORDER — METOPROLOL TARTRATE 25 MG/1
25 TABLET, FILM COATED ORAL
Qty: 15 | Refills: 3 | Status: ACTIVE | COMMUNITY
Start: 2020-11-30

## 2022-09-16 NOTE — DIETITIAN INITIAL EVALUATION ADULT. - OTHER INFO
PAST MEDICAL HISTORY:  No pertinent past medical history
70M former 7 pack year smoker (cigarettes and marijuana), with a past medical history significant for HTN, HLD, (remote prior PATY history formerly on CPAP resolved with diet/exercise), s/p cardiac catheterization in 2006 from referred left shoulder pain (findings 40% pLAD, 70% mLCx, 70% PDA determined unable to stent with adequate collateral). In May 2020, patient incidentally found to have CAD/NSTEMI after a hospital/ICU admission for babesiosis. NST 6/2020 revealed small, fixed, mild to moderate basal inferolateral defect with normal wall motion. Patient is followed by Dr. Partida, cardiology, who he presented to 11/11/2020 after several weeks of left sided chest pain with exertion (never at rest), described as a dull ache that resolves with rest. Pt went for an elective cardiac catheterization 11/19/2020 at [facility] the revealed 3 vessel disease with left main disease. Pt transferred to [facility] for surgical evaluation by Dr. Cunningham, and presurgical workup for CABG. Went to OR 11/23, extubated to [facility] and continues to recover in CTICU post-op at this time. At time of assessment pt with breakfast tray at bedside 100% consumed. Notes had not eaten since prior to surgery so has very good appetite at this time. No noted n/v/d, still no BM post-op but usually regularly has BM, no chewing/ swallowing issues or pain impacting intake, pain managed, skin with LLE incision, MSI. Encouraged intake through day and reinforced DASH diet with pt, receptive. Pt notes he's very active and wants to make diet changes. Will follow per protocol.

## 2022-12-28 NOTE — PATIENT PROFILE ADULT - HAS THE PATIENT RECEIVED THE INFLUENZA VACCINE THIS SEASON?
Pt states this med was called in to [facility] last month but would like it to go back to [facility] on [address] this month Please and Thank you yes...

## 2023-01-27 ENCOUNTER — NON-APPOINTMENT (OUTPATIENT)
Age: 73
End: 2023-01-27

## 2024-08-03 NOTE — PHYSICAL THERAPY INITIAL EVALUATION ADULT - NS ASR RISK AREAS PT EVAL
I want you to follow-up with the orthopedic surgery clinic for recheck.  There is a possible small crack along the bone where you had the previous fracture but otherwise the metal ulysses looks intact.  Return if you are having much worsening pain or other concerns.  Otherwise follow-up in clinic.   fall